# Patient Record
Sex: FEMALE | Race: OTHER | Employment: PART TIME | URBAN - METROPOLITAN AREA
[De-identification: names, ages, dates, MRNs, and addresses within clinical notes are randomized per-mention and may not be internally consistent; named-entity substitution may affect disease eponyms.]

---

## 2019-02-25 ENCOUNTER — OFFICE VISIT (OUTPATIENT)
Dept: PRIMARY CARE CLINIC | Age: 39
End: 2019-02-25

## 2019-02-25 VITALS
TEMPERATURE: 97.9 F | SYSTOLIC BLOOD PRESSURE: 123 MMHG | HEIGHT: 62 IN | WEIGHT: 148.4 LBS | OXYGEN SATURATION: 98 % | BODY MASS INDEX: 27.31 KG/M2 | HEART RATE: 70 BPM | DIASTOLIC BLOOD PRESSURE: 76 MMHG | RESPIRATION RATE: 16 BRPM

## 2019-02-25 DIAGNOSIS — M77.11 LATERAL EPICONDYLITIS OF RIGHT ELBOW: Primary | ICD-10-CM

## 2019-02-25 RX ORDER — METHYLPREDNISOLONE 4 MG/1
TABLET ORAL
Qty: 1 DOSE PACK | Refills: 0 | Status: SHIPPED | OUTPATIENT
Start: 2019-02-25 | End: 2019-05-08 | Stop reason: ALTCHOICE

## 2019-02-25 RX ORDER — IBUPROFEN 600 MG/1
600 TABLET ORAL
Qty: 20 TAB | Refills: 0 | Status: SHIPPED | OUTPATIENT
Start: 2019-02-25 | End: 2020-09-21

## 2019-02-25 NOTE — PROGRESS NOTES
Written by Reji Ware, as dictated by Dr. Mark Emerson MD.    Sarai Ferraro is a 43 y.o. female. HPI  The patient presents today c/o R arm burning pain. The pain starts on the lateral side above her elbow and travels down to her wrist. She notes that she has been experiencing numbness. Her arm is tender to touch. The patient has also noticed R arm weakness and has been dropping things. She took 2 tabs OTC Excedrin for her pain last night, without significant relief. Pt previously had surgery for R carpal tunnel and ulnar nerve decompression at elbow in 2017. As she has had to compensate for her R hand with her L, she has started having numbness in her L arm as well. Patient is a . Patient Active Problem List   Diagnosis Code    Right carpal tunnel syndrome G56.01        Current Outpatient Medications on File Prior to Visit   Medication Sig Dispense Refill    ibuprofen (MOTRIN) 800 mg tablet Take 1 Tab by mouth every eight (8) hours as needed for Pain. 45 Tab 0     No current facility-administered medications on file prior to visit. Allergies   Allergen Reactions    Hydrocodone Itching       No past medical history on file.     Past Surgical History:   Procedure Laterality Date    HX TUBAL LIGATION  2005       Family History   Problem Relation Age of Onset    Hypertension Mother    Tivis Abelson Arthritis-osteo Mother        Social History     Socioeconomic History    Marital status:      Spouse name: Not on file    Number of children: Not on file    Years of education: Not on file    Highest education level: Not on file   Social Needs    Financial resource strain: Not on file    Food insecurity - worry: Not on file    Food insecurity - inability: Not on file   [place] [employer] needs - medical: Not on file   [place] [employer] needs - non-medical: Not on file   Occupational History    Not on file   Tobacco Use    Smoking status: Never Smoker  Smokeless tobacco: Never Used   Substance and Sexual Activity    Alcohol use: Yes     Comment: socially    Drug use: No    Sexual activity: Yes     Partners: Male     Birth control/protection: None   Other Topics Concern    Not on file   Social History Narrative    Not on file       Review of Systems   Musculoskeletal: Positive for joint pain (R elbow). Negative for myalgias. +R arm weakness   Neurological: Positive for tingling (L and R arm). Negative for dizziness, sensory change and headaches. Psychiatric/Behavioral: Negative for depression, memory loss and substance abuse. Visit Vitals  /76 (BP 1 Location: Left arm, BP Patient Position: Sitting)   Pulse 70   Temp 97.9 °F (36.6 °C) (Oral)   Resp 16   Ht 5' 2\" (1.575 m)   Wt 148 lb 6.4 oz (67.3 kg)   SpO2 98%   BMI 27.14 kg/m²       Physical Exam   Constitutional: She is oriented to person, place, and time. She appears well-developed and well-nourished. No distress. Eyes: Conjunctivae and EOM are normal. Right eye exhibits no discharge. Left eye exhibits no discharge. Cardiovascular: Normal rate and regular rhythm. Pulmonary/Chest: Effort normal and breath sounds normal. She has no wheezes. Musculoskeletal:   Moderate tenderness on R arm lateral epicondyle  R lateral epicondyle painful on pronation and supination   Neurological: She is alert and oriented to person, place, and time. RUE 5/5   Skin: She is not diaphoretic. Burn scar marks on arm   Psychiatric: She has a normal mood and affect. Her behavior is normal.   Nursing note and vitals reviewed. ASSESSMENT and PLAN    ICD-10-CM ICD-9-CM    1. Lateral epicondylitis of right elbow M77.11 726.32 Medrol dosepack prescribed. Ibuprofen 600 mg prescribed. She should take 1 tab TID with food x 5-6 days. If she cannot take TID, she should take BID. I recommend wearing an arm brace and applying ice. She should take at least 2 days off from work. Home care instructions given. If her sxs persist or worsen, she should follow-up with orthopedics. Medication risks/benefits/costs/interactions/alternatives discussed with patient. Advised patient to call back or return to office if symptoms worsen/change/persist. If patient cannot reach us or should anything more severe/urgent arise she should proceed directly to the nearest emergency department. Discussed expected course/resolution/complications of diagnosis in detail with patient. Patient given a written after visit summary which includes her diagnoses, current medications and vitals. Patient expressed understanding with the diagnosis and plan.   Follow up if symptoms worsen. This plan was reviewed with the patient and patient agrees. All questions were answered. This scribe documentation was reviewed by me and accurately reflects the examination and decisions made by me.

## 2019-02-25 NOTE — PATIENT INSTRUCTIONS
Tennis Elbow: Exercises  Your Care Instructions  Here are some examples of typical rehabilitation exercises for your condition. Start each exercise slowly. Ease off the exercise if you start to have pain. Your doctor or physical therapist will tell you when you can start these exercises and which ones will work best for you. How to do the exercises  Wrist flexor stretch    1. Extend your arm in front of you with your palm up. 2. Bend your wrist, pointing your hand toward the floor. 3. With your other hand, gently bend your wrist farther until you feel a mild to moderate stretch in your forearm. 4. Hold for at least 15 to 30 seconds. Repeat 2 to 4 times. Wrist extensor stretch    1. Repeat steps 1 to 4 of the stretch above but begin with your extended hand palm down. Ball or sock squeeze    1. Hold a tennis ball (or a rolled-up sock) in your hand. 2. Make a fist around the ball (or sock) and squeeze. 3. Hold for about 6 seconds, and then relax for up to 10 seconds. 4. Repeat 8 to 12 times. 5. Switch the ball (or sock) to your other hand and do 8 to 12 times. Wrist deviation    1. Sit so that your arm is supported but your hand hangs off the edge of a flat surface, such as a table. 2. Hold your hand out like you are shaking hands with someone. 3. Move your hand up and down. 4. Repeat this motion 8 to 12 times. 5. Switch arms. 6. Try to do this exercise twice with each hand. Wrist curls    1. Place your forearm on a table with your hand hanging over the edge of the table, palm up. 2. Place a 1- to 2-pound weight in your hand. This may be a dumbbell, a can of food, or a filled water bottle. 3. Slowly raise and lower the weight while keeping your forearm on the table and your palm facing up. 4. Repeat this motion 8 to 12 times. 5. Switch arms, and do steps 1 through 4.  6. Repeat with your hand facing down toward the floor. Switch arms. Biceps curls    1.  Sit leaning forward with your legs slightly spread and your left hand on your left thigh. 2. Place your right elbow on your right thigh, and hold the weight with your forearm horizontal.  3. Slowly curl the weight up and toward your chest.  4. Repeat this motion 8 to 12 times. 5. Switch arms, and do steps 1 through 4. Follow-up care is a key part of your treatment and safety. Be sure to make and go to all appointments, and call your doctor if you are having problems. It's also a good idea to know your test results and keep a list of the medicines you take. Where can you learn more? Go to http://deondre-violeta.info/. Enter J558 in the search box to learn more about \"Tennis Elbow: Exercises. \"  Current as of: September 20, 2018  Content Version: 11.9  © 1946-6725 [employer]. Care instructions adapted under license by [employer] (which disclaims liability or warranty for this information). If you have questions about a medical condition or this instruction, always ask your healthcare professional. [place] [address] any warranty or liability for your use of this information.

## 2019-05-08 ENCOUNTER — OFFICE VISIT (OUTPATIENT)
Dept: PRIMARY CARE CLINIC | Age: 39
End: 2019-05-08

## 2019-05-08 ENCOUNTER — HOSPITAL ENCOUNTER (OUTPATIENT)
Dept: LAB | Age: 39
Discharge: HOME | End: 2019-05-08
Payer: COMMERCIAL

## 2019-05-08 VITALS
DIASTOLIC BLOOD PRESSURE: 69 MMHG | SYSTOLIC BLOOD PRESSURE: 110 MMHG | HEIGHT: 62 IN | TEMPERATURE: 98.2 F | OXYGEN SATURATION: 98 % | WEIGHT: 139.2 LBS | BODY MASS INDEX: 25.62 KG/M2 | HEART RATE: 71 BPM | RESPIRATION RATE: 17 BRPM

## 2019-05-08 DIAGNOSIS — N85.2 ENLARGED UTERUS: ICD-10-CM

## 2019-05-08 DIAGNOSIS — Z01.419 WOMEN'S ANNUAL ROUTINE GYNECOLOGICAL EXAMINATION: Primary | ICD-10-CM

## 2019-05-08 PROCEDURE — 87624 HPV HI-RISK TYP POOLED RSLT: CPT

## 2019-05-08 PROCEDURE — 88175 CYTOPATH C/V AUTO FLUID REDO: CPT

## 2019-05-08 PROCEDURE — 87491 CHLMYD TRACH DNA AMP PROBE: CPT

## 2019-05-08 NOTE — PROGRESS NOTES
Subjective:   43 y.o. female for Well Woman Check. She has been going through some family urgencies otherwise reports that she is doing fine. She has an appointment coming with psychotherapist next week. Strongly advised patient to let me know if she need any help. Her period sometimes could be heavy but usually normal length. Patient's last menstrual period was 04/05/2019. Social History: single partner, contraception - none. Pertinent past medical hstory: no history of HTN, DVT, CAD, DM, liver disease, migraines or smoking. Patient Active Problem List   Diagnosis Code    Right carpal tunnel syndrome G56.01     Current Outpatient Medications   Medication Sig Dispense Refill    ibuprofen (MOTRIN) 600 mg tablet Take 1 Tab by mouth every eight (8) hours as needed for Pain. 20 Tab 0     Allergies   Allergen Reactions    Hydrocodone Itching     No past medical history on file. Past Surgical History:   Procedure Laterality Date    HX TUBAL LIGATION  2005     Family History   Problem Relation Age of Onset    Hypertension Mother     Arthritis-osteo Mother      Social History     Tobacco Use    Smoking status: Never Smoker    Smokeless tobacco: Never Used   Substance Use Topics    Alcohol use: Yes     Comment: socially        ROS:  Feeling well. No dyspnea or chest pain on exertion. No abdominal pain, change in bowel habits, black or bloody stools. No urinary tract symptoms. GYN ROS: no breast pain or new or enlarging lumps on self exam, no vaginal bleeding, no discharge or pelvic pain, cyclic withdrawal menses only. No neurological complaints. Objective:     Visit Vitals  /69 (BP 1 Location: Left arm, BP Patient Position: Sitting)   Pulse 71   Temp 98.2 °F (36.8 °C) (Oral)   Resp 17   Ht 5' 2\" (1.575 m)   Wt 139 lb 3.2 oz (63.1 kg)   LMP 04/05/2019   SpO2 98%   BMI 25.46 kg/m²     The patient appears well, alert, oriented x 3, in no distress. ENT normal.  Neck supple.  No adenopathy or thyromegaly. JANNETH. Lungs are clear, good air entry, no wheezes, rhonchi or rales. S1 and S2 normal, no murmurs, regular rate and rhythm. Abdomen soft without tenderness, guarding,or organomegaly. Irregular masses noted in suprapubic area . Extremities show no edema, normal peripheral pulses. Neurological is normal, no focal findings. Skin: normal.     BREAST EXAM: breasts appear normal, no suspicious masses, no skin or nipple changes or axillary nodes, chaperoned by Emily Holguin. PELVIC EXAM: VULVA: normal appearing vulva with no masses, tenderness or lesions, VAGINA: normal appearing vagina with normal color and discharge, no lesions, CERVIX: normal appearing cervix without discharge or lesions, UTERUS: enlarged uterus,, ADNEXA: normal adnexa in size, nontender and no masses, PAP: Pap smear done today, exam chaperoned by Emily Holguin. Assessment/Plan:   well woman  mammogram  pap smear  counseled on breast self exam and mammography screening  additional lab tests per orders  return annually or prn    ICD-10-CM ICD-9-CM    1. Women's annual routine gynecological examination Z01.419 V72.31 PAP IG, CT-NG-TV, APTIMA HPV AND RFX 52/35,96([id number])      LIPID PANEL      METABOLIC PANEL, COMPREHENSIVE      HEMOGLOBIN A1C WITH EAG      CBC WITH AUTOMATED DIFF      TSH 3RD GENERATION      PAP IG, CT-NG-TV, APTIMA HPV AND RFX 17/11,57([id number])   2. Enlarged uterus N85.2 621.2 US PELV NON OB W TV     Diagnoses and all orders for this visit:    1. Women's annual routine gynecological examination  -     PAP IG, CT-NG-TV, APTIMA HPV AND [address] 63/75,06([id number]); Future  -     LIPID PANEL  -     METABOLIC PANEL, COMPREHENSIVE  -     HEMOGLOBIN A1C WITH EAG  -     CBC WITH AUTOMATED DIFF  -     TSH 3RD GENERATION    2. Enlarged uterus  -   D/D is fibroid, ovarian pathology. US PELV NON OB W TV; Future             Will notify result and recommendation.      Follow-up and Dispositions    · Return if symptoms worsen or fail to improve. current treatment plan is effective, no change in therapy  reviewed diet, exercise and weight control.

## 2019-05-08 NOTE — PATIENT INSTRUCTIONS

## 2019-05-09 LAB
ALBUMIN SERPL-MCNC: 4.4 G/DL (ref 3.5–5.5)
ALBUMIN/GLOB SERPL: 1.8 {RATIO} (ref 1.2–2.2)
ALP SERPL-CCNC: 61 IU/L (ref 39–117)
ALT SERPL-CCNC: 9 IU/L (ref 0–32)
AST SERPL-CCNC: 16 IU/L (ref 0–40)
BASOPHILS # BLD AUTO: 0.1 X10E3/UL (ref 0–0.2)
BASOPHILS NFR BLD AUTO: 2 %
BILIRUB SERPL-MCNC: <0.2 MG/DL (ref 0–1.2)
BUN SERPL-MCNC: 9 MG/DL (ref 6–20)
BUN/CREAT SERPL: 15 (ref 9–23)
CALCIUM SERPL-MCNC: 9.3 MG/DL (ref 8.7–10.2)
CHLORIDE SERPL-SCNC: 102 MMOL/L (ref 96–106)
CHOLEST SERPL-MCNC: 240 MG/DL (ref 100–199)
CO2 SERPL-SCNC: 22 MMOL/L (ref 20–29)
CREAT SERPL-MCNC: 0.6 MG/DL (ref 0.57–1)
EOSINOPHIL # BLD AUTO: 0.6 X10E3/UL (ref 0–0.4)
EOSINOPHIL NFR BLD AUTO: 11 %
ERYTHROCYTE [DISTWIDTH] IN BLOOD BY AUTOMATED COUNT: 15.4 % (ref 12.3–15.4)
EST. AVERAGE GLUCOSE BLD GHB EST-MCNC: 111 MG/DL
GLOBULIN SER CALC-MCNC: 2.4 G/DL (ref 1.5–4.5)
GLUCOSE SERPL-MCNC: 100 MG/DL (ref 65–99)
HBA1C MFR BLD: 5.5 % (ref 4.8–5.6)
HCT VFR BLD AUTO: 39.5 % (ref 34–46.6)
HDLC SERPL-MCNC: 88 MG/DL
HGB BLD-MCNC: 12.9 G/DL (ref 11.1–15.9)
IMM GRANULOCYTES # BLD AUTO: 0 X10E3/UL (ref 0–0.1)
IMM GRANULOCYTES NFR BLD AUTO: 0 %
LDLC SERPL CALC-MCNC: 139 MG/DL (ref 0–99)
LYMPHOCYTES # BLD AUTO: 1.5 X10E3/UL (ref 0.7–3.1)
LYMPHOCYTES NFR BLD AUTO: 25 %
MCH RBC QN AUTO: 27.9 PG (ref 26.6–33)
MCHC RBC AUTO-ENTMCNC: 32.7 G/DL (ref 31.5–35.7)
MCV RBC AUTO: 86 FL (ref 79–97)
MONOCYTES # BLD AUTO: 0.4 X10E3/UL (ref 0.1–0.9)
MONOCYTES NFR BLD AUTO: 7 %
NEUTROPHILS # BLD AUTO: 3.3 X10E3/UL (ref 1.4–7)
NEUTROPHILS NFR BLD AUTO: 55 %
PLATELET # BLD AUTO: 337 X10E3/UL (ref 150–379)
POTASSIUM SERPL-SCNC: 4.3 MMOL/L (ref 3.5–5.2)
PROT SERPL-MCNC: 6.8 G/DL (ref 6–8.5)
RBC # BLD AUTO: 4.62 X10E6/UL (ref 3.77–5.28)
SODIUM SERPL-SCNC: 137 MMOL/L (ref 134–144)
TRIGL SERPL-MCNC: 64 MG/DL (ref 0–149)
TSH SERPL DL<=0.005 MIU/L-ACNC: 4.3 UIU/ML (ref 0.45–4.5)
VLDLC SERPL CALC-MCNC: 13 MG/DL (ref 5–40)
WBC # BLD AUTO: 5.9 X10E3/UL (ref 3.4–10.8)

## 2019-05-09 NOTE — PROGRESS NOTES
Please mail the letter:     1. CBC, kidney, liver, thyroid level is within normal range. 2. Total cholesterol level and the  bad cholesterol level is elevated than normal range. Three years ago LLD level was 94 which went up to 139 this time. .Total cholesterol is up from 196 to 240. No need to start any medication at this point. Continue watching your diet, eat healthy, less marie and fatty food, more baked or grilled or broiled food. Exercise 150 minutes/week will be helpful as well. Will recheck level in one year. 3. No diabetes.

## 2019-05-10 DIAGNOSIS — N85.2 ENLARGED UTERUS: Primary | ICD-10-CM

## 2019-05-10 LAB
C TRACH RRNA SPEC QL NAA+PROBE: NEGATIVE
N GONORRHOEA RRNA SPEC QL NAA+PROBE: NEGATIVE
SPECIMEN SOURCE: NORMAL
T VAGINALIS RRNA SPEC QL NAA+PROBE: NEGATIVE

## 2019-05-13 NOTE — PROGRESS NOTES
Please mail letter;    Mayme Honor pleased to let you know that the results of your Pap smear and HPV (human papillomavirus) tests, performed during your recent examination, were normal.  In accordance with the guidelines of the [facility]etric[facility] Gyne[facility], we recommend you return for your well-woman exam in 3-5 years.

## 2019-05-28 ENCOUNTER — HOSPITAL ENCOUNTER (OUTPATIENT)
Dept: ULTRASOUND IMAGING | Age: 39
Discharge: HOME | End: 2019-05-28
Attending: FAMILY MEDICINE
Payer: COMMERCIAL

## 2019-05-28 DIAGNOSIS — N85.2 ENLARGED UTERUS: ICD-10-CM

## 2019-05-28 PROBLEM — D21.9 FIBROID: Status: ACTIVE | Noted: 2019-05-28

## 2019-05-28 PROCEDURE — 76856 US EXAM PELVIC COMPLETE: CPT

## 2019-05-28 NOTE — PROGRESS NOTES
Discussed about the finding with her over phone. Patient is asymptomatic currently. No heavy bleeding or any abnormal period or any urinary symptoms. Mention that she did notice episode of sharp pain in left side at time but otherwise she is asymptomatic. Plan is to continue to monitor and she will follow up with Gyn if needed.

## 2019-06-04 ENCOUNTER — TELEPHONE (OUTPATIENT)
Dept: PRIMARY CARE CLINIC | Age: 39
End: 2019-06-04

## 2019-06-04 DIAGNOSIS — D21.9 FIBROID: Primary | ICD-10-CM

## 2019-06-04 NOTE — TELEPHONE ENCOUNTER
Patient says she has sharp pains before starting her period. Her periods are heavy. She says she was told the fibroids in uterus are not a big deal. She would like to look into this further, and get it taken care of.   She asks about needing a referral?

## 2019-06-05 NOTE — TELEPHONE ENCOUNTER
LVM informing pt to contact [facility], contact information provided to make an appt for evaluation.

## 2019-06-19 ENCOUNTER — OFFICE VISIT (OUTPATIENT)
Dept: OBGYN CLINIC | Age: 39
End: 2019-06-19

## 2019-06-19 VITALS
WEIGHT: 142 LBS | HEIGHT: 62 IN | BODY MASS INDEX: 26.13 KG/M2 | DIASTOLIC BLOOD PRESSURE: 64 MMHG | SYSTOLIC BLOOD PRESSURE: 112 MMHG

## 2019-06-19 DIAGNOSIS — N84.1 CERVICAL POLYP: ICD-10-CM

## 2019-06-19 DIAGNOSIS — N84.0 UTERINE POLYP: ICD-10-CM

## 2019-06-19 DIAGNOSIS — N93.9 ABNORMAL UTERINE BLEEDING (AUB): Primary | ICD-10-CM

## 2019-06-19 LAB
HCG URINE, QL. (POC): NEGATIVE
VALID INTERNAL CONTROL?: YES

## 2019-06-19 NOTE — PATIENT INSTRUCTIONS
Abnormal Uterine Bleeding: Care Instructions  Your Care Instructions    Abnormal uterine bleeding (AUB) is irregular bleeding from the uterus that is longer or heavier than usual or does not occur at your regular time. Sometimes it is caused by changes in hormone levels. It can also be caused by growths in the uterus, such as fibroids or polyps. Sometimes a cause cannot be found. You may have heavy bleeding when you are not expecting your period. Your doctor may suggest a pregnancy test, if you think you are pregnant. Follow-up care is a key part of your treatment and safety. Be sure to make and go to all appointments, and call your doctor if you are having problems. It's also a good idea to know your test results and keep a list of the medicines you take. How can you care for yourself at home? · Be safe with medicines. Take pain medicines exactly as directed. ? If the doctor gave you a prescription medicine for pain, take it as prescribed. ? If you are not taking a prescription pain medicine, ask your doctor if you can take an over-the-counter medicine. · You may be low in iron because of blood loss. Eat a balanced diet that is high in iron and vitamin C. Foods rich in iron include red meat, shellfish, eggs, beans, and leafy green vegetables. Talk to your doctor about whether you need to take iron pills or a multivitamin. When should you call for help? Call 911 anytime you think you may need emergency care. For example, call if:    · You passed out (lost consciousness).    Call your doctor now or seek immediate medical care if:    · You have new or worse belly or pelvic pain.     · You have severe vaginal bleeding.     · You feel dizzy or lightheaded, or you feel like you may faint.    Watch closely for changes in your health, and be sure to contact your doctor if:    · You think you may be pregnant.     · Your bleeding gets worse.     · You do not get better as expected.    Where can you learn more?  Go to http://deondre-violeta.info/. Enter Q065 in the search box to learn more about \"Abnormal Uterine Bleeding: Care Instructions. \"  Current as of: May 14, 2018  Content Version: 11.9  © 3947-6111 [employer]. Care instructions adapted under license by [employer] (which disclaims liability or warranty for this information). If you have questions about a medical condition or this instruction, always ask your healthcare professional. [place] [address] any warranty or liability for your use of this information.

## 2019-06-19 NOTE — PROGRESS NOTES
Abnormal Uterine Bleeding      Ms. Christopher Iglesias is a 43 y.o. No obstetric history on file. female presenting for evaluation of abnormal uterine bleeding. She states she has heavy menses. Her PCP ordered an ultrasound - see images. RECENTLY DIAGNOSED WITH FIBROIDS    Her current episode of bleeding heavy last for the first 1-2 days of her 4 day cycle. Previously regular monthly cycles? yes  Menses typically last 4 days. Intermenstrual spotting/bleeding? no    Passage of large clots? yes  Flooding? no  Associated symptoms include sporadic/ occasional pelvic pain. CP, SOB, dizziness, weakness or fatigue? no  Signs of excess androgens (ie-unwanted hair growth, acne, etc)? no    Onset: last month  Location: vaginal  Severity: 1/10  Modifying factors: improves with rest    History of anemia? no  Prior transfusions? no    Ob/Gyn Hx:  No obstetric history on file. Patient's last menstrual period was 06/09/2019 (exact date).   Contraception:  STI: Denies  SA- Yes, 1 partners        Past Medical History:   Diagnosis Date    Pap smear for cervical cancer screening 5/8/19 neg HPV NEG        Past Surgical History:   Procedure Laterality Date    HX TUBAL LIGATION  2005       Family History   Problem Relation Age of Onset    Hypertension Mother    [facility] Arthritis-osteo Mother        Social History     Socioeconomic History    Marital status:      Spouse name: Not on file    Number of children: Not on file    Years of education: Not on file    Highest education level: Not on file   Occupational History    Not on file   Social Needs    Financial resource strain: Not on file    Food insecurity:     Worry: Not on file     Inability: Not on file    Transportation needs:     Medical: Not on file     Non-medical: Not on file   Tobacco Use    Smoking status: Never Smoker    Smokeless tobacco: Never Used   Substance and Sexual Activity    Alcohol use: Yes     Comment: socially    Drug use: No    Sexual activity: Yes     Partners: Male     Birth control/protection: None   Lifestyle    Physical activity:     Days per week: Not on file     Minutes per session: Not on file    Stress: Not on file   Relationships    Social connections:     Talks on phone: Not on file     Gets together: Not on file     Attends Yarsani service: Not on file     Active member of club or organization: Not on file     Attends meetings of clubs or organizations: Not on file     Relationship status: Not on file    Intimate partner violence:     Fear of current or ex partner: Not on file     Emotionally abused: Not on file     Physically abused: Not on file     Forced sexual activity: Not on file   Other Topics Concern    Not on file   Social History Narrative    Not on file       Prior to Admission medications    Medication Sig Start Date End Date Taking? Authorizing Provider   ibuprofen (MOTRIN) 600 mg tablet Take 1 Tab by mouth every eight (8) hours as needed for Pain.  2/25/19   Terrance Bartlett MD        Allergies   Allergen Reactions    Hydrocodone Itching       Review of Systems - History obtained from the patient  Constitutional: negative for weight loss, fever, night sweats  HEENT: negative for hearing loss, earache, congestion, snoring, sorethroat  CV: negative for chest pain, palpitations, edema  Resp: negative for cough, shortness of breath, wheezing  Breast: negative for breast lumps, nipple discharge, galactorrhea  GI: negative for change in bowel habits, abdominal pain, black or bloody stools  : negative for frequency, dysuria, hematuria, +vaginal bleeding, as per HPI  MSK: negative for back pain, joint pain, muscle pain  Skin: negative for itching, rash, hives  Neuro: negative for dizziness, headache, confusion, weakness  Psych: negative for anxiety, depression, change in mood  Heme/lymph: negative for bleeding, bruising, pallor      Objective:    Visit Vitals  /64   Ht 5' 2\" (1.575 m)   Wt 142 lb (64.4 kg)   LMP 06/09/2019 (Exact Date)   BMI 25.97 kg/m²       PHYSICAL EXAMINATION    Constitutional  · Appearance: well-nourished, well developed, alert, in no acute distress    HENT  · Head and Face: appears normal    Neck  · Inspection/Palpation: normal appearance, no masses or tenderness  · Lymph Nodes: no lymphadenopathy present  · Thyroid: gland size normal, nontender, no nodules or masses present on palpation    Chest  · Respiratory Effort: non-labored breathing  · Auscultation: CTAB, normal breath sounds    Cardiovascular  · Heart:  · Auscultation: regular rate and rhythm without murmur  · Extremities: no peripheral edema    Gastrointestinal  · Abdominal Examination: abdomen non-tender to palpation, normal bowel sounds, no masses present  · Liver and spleen: no hepatomegaly present, spleen not palpable  · Hernias: no hernias identified    Genitourinary  · External Genitalia: normal appearance for age, no discharge present, no tenderness present, no inflammatory lesions present, no masses present, no atrophy present  · Vagina: normal vaginal vault without central or paravaginal defects, no discharge present, no inflammatory lesions present, no masses present, 15 scopettes of blood in vault  · Bladder: non-tender to palpation  · Urethra: appears normal  · Cervix: normal , 1cm cervical polyp noted  · Uterus: enlarged size, irregular shape and consistency, mobile  · Adnexa: no adnexal tenderness present, no adnexal masses present  · Perineum: perineum within normal limits, no evidence of trauma, no rashes or skin lesions present    Skin  · General Inspection: no rash, no lesions identified    Neurologic/Psychiatric  · Mental Status:  · Orientation: grossly oriented to person, place and time  · Mood and Affect: mood normal, affect appropriate    No results found for this or any previous visit (from the past 24 hour(s)).       Assessment/Plan:   Aleta Siu is a 43 y.o. female presenting for evaluation of AUB/nonbothersome fibroids. .    Discussed possible etiologies of AUB. Discussed work-up of AUB including exam today  TSH was recently normal.  Pap was normal  EMB today    Discussed possible options for mgmt including expectant versus medical mgmt with (iron, OCPs v oral or implantable progesterone) versus endometrial ablation versus uterine artery embolization versus hysterectomy. EMB and labs today. Will call with results. Discussed scheduled NSAIDS with menses if needed. Discussed OTC iron supplemenation 1-2 times daily. Reviewed bleeding precautions and reasons to come into ER (bleeding >1-2 pads/tampons per hour, associated dizziness, SOB, CP, fever, etc). All questions answered. RTC: prn or sooner for any problems or concerns  Instructions and handouts given to patient    Christian Holguin  6/19/2019  2:51 PM       EMB Procedure Note    Chart reviewed for the following:  I have reviewed the History, Physical and updated the Allergic reactions for Cherelle Alvarez. TIME OUT performed immediately prior to start of procedure:  I have performed the following reviews on Cherelle Alvarez prior to the start of the procedure:          Patient was identified by name and date of birth   Agreement on procedure being performed was verified  Risks and Benefits explained to the patient  Consent was signed and verified     Time: 1500  Date of procedure: 6/19/2019  Procedure performed by:  Sera Gaming MD  How tolerated by patient: Well  Post Procedural Pain Scale: 2  Comments: None    Endometrial biopsy  Indications:   Cherelle Alvarez is a 43 y.o. No obstetric history on file., Patient's last menstrual period was 06/09/2019 (exact date). , who presents for an endometrial biopsy for AUB. After the indications, risks, benefits, and alternatives to performing an endometrial biopsy were explained to the patient, her questions were answered and informed consent was obtained. Procedure:   The patient was placed on the table in the dorsal lithotomy position. A bimanual exam showed the uterus to be anterior. The uterus was minmally enlarged. A speculum was placed in the vagina. The cervix was visualized. A tenaculum was NOT placed on the anterior lip of the cervix for traction. It was not necessary to dilate the cervix. A pipelle was passed through the endocervical canal without difficulty. The uterus was sounded to 9 cm's. A moderate amount of tissue was returned. This tissue was placed in formalin and sent to pathology. It was felt that an adequate sample was obtained. All instruments were removed from the vagina after hemostasis was noted. Post Procedural Status: The patient tolerated the procedure well and she reported mild cramping. The tenaculum and speculum were removed. The patient was observed for 10 minutes after the procedure. She had mild cramping at the time of discharge. There were no complications. The patient was discharged in stable condition. Will call with results. Charles Padron MD      Procedure Note    Chart reviewed for the following:  I have reviewed the History, Physical and updated the Allergic reactions for Michaela Asencio. TIME OUT performed immediately prior to start of procedure:  I have performed the following reviews on Michaela Asencio prior to the start of the procedure:          Patient was identified by name and date of birth   Agreement on procedure being performed was verified  Risks and Benefits explained to the patient  Consent was signed and verified     Time: 1500  Date of procedure: 6/19/2019  Procedure performed by:  Charles Padron MD  How tolerated by patient: Well  Post Procedural Pain Scale: 2  Comments: None    Cervical biopsy  Indications:   Michaela Asencio is a 43 y.o. No obstetric history on file., Patient's last menstrual period was 06/09/2019 (exact date). , who presents for an cervical polypectomy.  After the indications, risks, benefits, and alternatives to performing an endometrial biopsy were explained to the patient, her questions were answered and informed consent was obtained. Procedure: The patient was placed on the table in the dorsal lithotomy position. A speculum was placed in the vagina. The cervix was visualized. Ring forceps were used to perform cervical polypectomy. All instruments were removed from the vagina after hemostasis was noted. Post Procedural Status: The patient tolerated the procedure well and she reported mild cramping. The tenaculum and speculum were removed. The patient was observed for 10 minutes after the procedure. She had mild cramping at the time of discharge. There were no complications. The patient was discharged in stable condition. Will call with results.     Russ Nogueira MD

## 2019-06-24 LAB
DX ICD CODE: NORMAL
PATH REPORT.FINAL DX SPEC: NORMAL
PATH REPORT.FINAL DX SPEC: NORMAL
PATH REPORT.GROSS SPEC: NORMAL
PATH REPORT.GROSS SPEC: NORMAL
PATH REPORT.SITE OF ORIGIN SPEC: NORMAL
PATH REPORT.SITE OF ORIGIN SPEC: NORMAL
PATHOLOGIST NAME: NORMAL
PATHOLOGIST NAME: NORMAL
PAYMENT PROCEDURE: NORMAL
PAYMENT PROCEDURE: NORMAL

## 2020-02-12 ENCOUNTER — OFFICE VISIT (OUTPATIENT)
Dept: PRIMARY CARE CLINIC | Age: 40
End: 2020-02-12

## 2020-02-12 VITALS
DIASTOLIC BLOOD PRESSURE: 73 MMHG | WEIGHT: 146.2 LBS | HEART RATE: 84 BPM | SYSTOLIC BLOOD PRESSURE: 115 MMHG | TEMPERATURE: 98.2 F | OXYGEN SATURATION: 99 % | HEIGHT: 62 IN | RESPIRATION RATE: 16 BRPM | BODY MASS INDEX: 26.91 KG/M2

## 2020-02-12 DIAGNOSIS — F41.9 ANXIETY HYPERVENTILATION: Primary | ICD-10-CM

## 2020-02-12 DIAGNOSIS — R07.89 CHEST PRESSURE: ICD-10-CM

## 2020-02-12 DIAGNOSIS — F45.8 ANXIETY HYPERVENTILATION: Primary | ICD-10-CM

## 2020-02-12 DIAGNOSIS — F41.9 ANXIETY: ICD-10-CM

## 2020-02-12 DIAGNOSIS — L25.9 SKIN IRRITATION FROM SHAVING: ICD-10-CM

## 2020-02-12 NOTE — PROGRESS NOTES
Subjective:     Chief Complaint   Patient presents with    Shortness of Breath     x 1 month, trouble taking deep breaths and chest gets tight and painful, having s/s now under breast in middle    Skin Problem     woke up this morning feeling itchy all over, started on legs this AM, now all over. First noticed the itchiness over the weekend, changed soaps and still itchy        She  is a 35y.o. year old female who presents today with a concern about chest tightness about a month. She reports that she has been having trouble taking full breath. Chest feels tight and feels painful under her breast in the middle. Pain does not radiate. Denies any cough, fever, chills. She states that she is going through a lot for the past one year . Constant stress, feels anxious all the time which is getting more harder to deal lately. C/o redness, pain in her both leg after shaving her legs about a week ago. Legs are sensitive to touch. Noticed hive like of swelling in her back after she took hot shower in the weakened. It was very itchy but the swelling is gone now. Pertinent items are noted in HPI. Objective:     Vitals:    02/12/20 1508   BP: 115/73   Pulse: 84   Resp: 16   Temp: 98.2 °F (36.8 °C)   TempSrc: Oral   SpO2: 99%   Weight: 146 lb 3.2 oz (66.3 kg)   Height: 5' 2\" (1.575 m)       Physical Examination: General appearance - alert, well appearing, and in no distress, oriented to person, place, and time, normal appearing weight, crying and sobbing, hyperventilating. Consolable. Mental status - alert, oriented to person, place, and time, normal speech, dress, motor activity, and thought processes. Sobbing, tearful, hyperventilating. Chest - clear to auscultation, no wheezes, rales or rhonchi, symmetric air entry  Heart - normal rate, regular rhythm, normal S1, S2, no murmurs, rubs, clicks or gallops  Skin - skin irritation from shaving noted in her both legs.             Couple of superficial , non infected burn marks noted in her right forearm. 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 Allergies   Allergen Reactions    Hydrocodone Itching      Social History     Socioeconomic History    Marital status:      Spouse name: Not on file    Number of children: Not on file    Years of education: Not on file    Highest education level: Not on file   Tobacco Use    Smoking status: Never Smoker    Smokeless tobacco: Never Used   Substance and Sexual Activity    Alcohol use: Not Currently     Comment: rarely    Drug use: No    Sexual activity: Yes     Partners: Male     Birth control/protection: None      Family History   Problem Relation Age of Onset    Hypertension Mother     Arthritis-osteo Mother       Past Surgical History:   Procedure Laterality Date    HX TUBAL LIGATION  2005      Past Medical History:   Diagnosis Date    Pap smear for cervical cancer screening 5/8/19 neg HPV NEG      Current Outpatient Medications   Medication Sig Dispense Refill    ibuprofen (MOTRIN) 600 mg tablet Take 1 Tab by mouth every eight (8) hours as needed for Pain. 20 Tab 0        Assessment/ Plan:   Diagnoses and all orders for this visit:    1. Anxiety hyperventilation     -  After taking her history and observation I believe she is experiencing sever anxiety which causing the chest pressure as well shallow breathing. She agrees with me. I offered EKG but she deferred. She states that she does not think its heart related. I offered anxiety meds but she declined. Coping skills discussed. She is currently following up with a therapist.   2. Chest pressure        Same as #1  3. Anxiety      Same as #1  4. Skin irritation from shaving       Avoid shaving. Use ice, tylenol/advil. Medication risks/benefits/costs/interactions/alternatives discussed with patient.   Advised patient to call back or return to office if symptoms worsen/change/persist. If patient cannot reach us or should anything more severe/urgent arise he/she should proceed directly to the nearest emergency department. Discussed expected course/resolution/complications of diagnosis in detail with patient. Patient given a written after visit summary which includes her diagnoses, current medications and vitals. Patient expressed understanding with the diagnosis and plan. Follow-up and Dispositions    · Return if symptoms worsen or fail to improve.

## 2020-02-12 NOTE — PROGRESS NOTES
Ayden Vidal is a 35 y.o. female    Chief Complaint   Patient presents with    Shortness of Breath     x 1 month, trouble taking deep breaths and chest gets tight and painful, having s/s now under breast in middle    Skin Problem     woke up this morning feeling itchy all over, started on legs this AM, now all over. First noticed the itchiness over the weekend, changed soaps and still itchy       1. Have you been to the ER, urgent care clinic since your last visit? Hospitalized since your last visit? No    2. Have you seen or consulted any other health care providers outside of the [address] [place] [address] since your last visit? Include any pap smears or colon screening. No    No flowsheet data found.      Health Maintenance Due   Topic Date Due    Influenza Age 5 to Adult  08/01/2019

## 2020-02-18 ENCOUNTER — OFFICE VISIT (OUTPATIENT)
Dept: PRIMARY CARE CLINIC | Age: 40
End: 2020-02-18

## 2020-02-18 VITALS
WEIGHT: 147.6 LBS | OXYGEN SATURATION: 99 % | DIASTOLIC BLOOD PRESSURE: 79 MMHG | SYSTOLIC BLOOD PRESSURE: 131 MMHG | HEIGHT: 62 IN | HEART RATE: 84 BPM | TEMPERATURE: 98.2 F | BODY MASS INDEX: 27.16 KG/M2 | RESPIRATION RATE: 16 BRPM

## 2020-02-18 DIAGNOSIS — L50.9 HIVES: Primary | ICD-10-CM

## 2020-02-18 RX ORDER — CETIRIZINE HCL 10 MG
10 TABLET ORAL DAILY
Qty: 30 TAB | Refills: 1 | Status: SHIPPED | OUTPATIENT
Start: 2020-02-18 | End: 2020-09-21

## 2020-02-18 RX ORDER — PREDNISONE 20 MG/1
TABLET ORAL
Qty: 21 TAB | Refills: 0 | Status: SHIPPED | OUTPATIENT
Start: 2020-02-18 | End: 2020-09-21

## 2020-02-18 NOTE — PROGRESS NOTES
Xiang Beaver is a 35 y.o. female    Chief Complaint   Patient presents with    Rash     getting worse since last visit and nothing is helping. Now spreading all over body. 1. Have you been to the ER, urgent care clinic since your last visit? Hospitalized since your last visit? No    2. Have you seen or consulted any other health care providers outside of the [address] [place] [address] since your last visit? Include any pap smears or colon screening. No    No flowsheet data found. There are no preventive care reminders to display for this patient.

## 2020-02-18 NOTE — PROGRESS NOTES
Subjective:     Chief Complaint   Patient presents with    Rash     getting worse since last visit and nothing is helping. Now spreading all over body. She  is a 35y.o. year old female who presents today with a c/o itchy hives in her upper chest, face , left forearm for the past one week. She had tried benadryl, topical cream but nothing been working. She cannot figure out if anything that has triggered her symptoms. Denies using any new food, med, lotion, perfume, detergent. No new pets. Denies any cough, throat swelling. Pertinent items are noted in HPI. Objective:     Vitals:    02/18/20 1553   BP: 131/79   Pulse: 84   Resp: 16   Temp: 98.2 °F (36.8 °C)   TempSrc: Oral   SpO2: 99%   Weight: 147 lb 9.6 oz (67 kg)   Height: 5' 2\" (1.575 m)       Physical Examination: General appearance - alert, well appearing, and in no distress, oriented to person, place, and time and overweight  Mental status - alert, oriented to person, place, and time, normal mood, behavior, speech, dress, motor activity, and thought processes  Skin - hives noted in her upper chest, neck, face and left forearm.     Allergies   Allergen Reactions    Hydrocodone Itching      Social History     Socioeconomic History    Marital status:      Spouse name: Not on file    Number of children: Not on file    Years of education: Not on file    Highest education level: Not on file   Tobacco Use    Smoking status: Never Smoker    Smokeless tobacco: Never Used   Substance and Sexual Activity    Alcohol use: Not Currently     Comment: rarely    Drug use: No    Sexual activity: Yes     Partners: Male     Birth control/protection: None      Family History   Problem Relation Age of Onset    Hypertension Mother     Arthritis-osteo Mother       Past Surgical History:   Procedure Laterality Date    HX TUBAL LIGATION  2005      Past Medical History:   Diagnosis Date    Pap smear for cervical cancer screening 5/8/19 neg HPV NEG Current Outpatient Medications   Medication Sig Dispense Refill    ibuprofen (MOTRIN) 600 mg tablet Take 1 Tab by mouth every eight (8) hours as needed for Pain. 20 Tab 0        Assessment/ Plan:   Diagnoses and all orders for this visit:    1. Hives  -    Cause unknown. -   Start  predniSONE (DELTASONE) 20 mg tablet; Take 3 tab po daily for 3 days then 2 tab daily for 3 days, then 1 tab daily for 3 days, then 1/2 tab daily for 3 days.  -   Start   cetirizine (ZYRTEC) 10 mg tablet; Take 1 Tab by mouth daily. Medication risks/benefits/costs/interactions/alternatives discussed with patient. Advised patient to call back or return to office if symptoms worsen/change/persist. If patient cannot reach us or should anything more severe/urgent arise he/she should proceed directly to the nearest emergency department. Discussed expected course/resolution/complications of diagnosis in detail with patient. Patient given a written after visit summary which includes her diagnoses, current medications and vitals. Patient expressed understanding with the diagnosis and plan. Follow-up and Dispositions    · Return if symptoms worsen or fail to improve.

## 2020-09-21 ENCOUNTER — OFFICE VISIT (OUTPATIENT)
Dept: PRIMARY CARE CLINIC | Age: 40
End: 2020-09-21
Payer: MEDICAID

## 2020-09-21 VITALS
HEART RATE: 74 BPM | TEMPERATURE: 98.1 F | BODY MASS INDEX: 27.05 KG/M2 | SYSTOLIC BLOOD PRESSURE: 127 MMHG | HEIGHT: 62 IN | WEIGHT: 147 LBS | OXYGEN SATURATION: 100 % | DIASTOLIC BLOOD PRESSURE: 80 MMHG

## 2020-09-21 DIAGNOSIS — Z00.00 WELL ADULT ON ROUTINE HEALTH CHECK: Primary | ICD-10-CM

## 2020-09-21 DIAGNOSIS — D21.9 FIBROID: ICD-10-CM

## 2020-09-21 DIAGNOSIS — Z12.39 SCREENING FOR BREAST CANCER: ICD-10-CM

## 2020-09-21 PROCEDURE — 99396 PREV VISIT EST AGE 40-64: CPT | Performed by: FAMILY MEDICINE

## 2020-09-21 NOTE — PROGRESS NOTES
Room:     Identified pt with two pt identifiers. Reviewed record in preparation for visit and have obtained necessary documentation. All patient medications has been reviewed. Chief Complaint   Patient presents with    Physical     Additional information about chief complaint:    Health Maintenance Due   Topic    Flu Vaccine (1)       1. Have you been to the ER, urgent care clinic since your last visit? Hospitalized since your last visit? No    2. Have you seen or consulted any other health care providers outside of the [address] [place] [address] since your last visit? Include any pap smears or colon screening. No    Patient is accompanied by self I have received verbal consent from Pool Katz to discuss any/all medical information while they are present in the room.     bdg

## 2020-09-21 NOTE — PROGRESS NOTES
Subjective:   35 y.o. female for Well Woman Check. Patient's last menstrual period was 09/12/2020 (exact date). Social History: single partner, contraception - none. Pertinent past medical hstory: no history of HTN, DVT, CAD, DM, liver disease, migraines or smoking. Patient Active Problem List   Diagnosis Code    Right carpal tunnel syndrome G56.01    Fibroid D21.9     Current Outpatient Medications   Medication Sig Dispense Refill    multivit,calc,mins/iron/folic (ONE-A-DAY WOMENS FORMULA PO) Take  by mouth.  Lactobac no.41/Bifidobact no.7 (PROBIOTIC-10 PO) Take  by mouth.  predniSONE (DELTASONE) 20 mg tablet Take 3 tab po daily for 3 days then 2 tab daily for 3 days, then 1 tab daily for 3 days, then 1/2 tab daily for 3 days. 21 Tab 0    cetirizine (ZYRTEC) 10 mg tablet Take 1 Tab by mouth daily. 30 Tab 1    ibuprofen (MOTRIN) 600 mg tablet Take 1 Tab by mouth every eight (8) hours as needed for Pain. 20 Tab 0     Allergies   Allergen Reactions    Hydrocodone Itching     Past Medical History:   Diagnosis Date    Pap smear for cervical cancer screening 5/8/19 neg HPV NEG     Past Surgical History:   Procedure Laterality Date    HX TUBAL LIGATION  2005        ROS:  Feeling well. No dyspnea or chest pain on exertion. No abdominal pain, change in bowel habits, black or bloody stools. No urinary tract symptoms. GYN ROS: no breast pain or new or enlarging lumps on self exam, cyclic withdrawal menses only with heavy bleeding. No neurological complaints. Objective:     Visit Vitals  /80 (BP 1 Location: Left arm, BP Patient Position: Sitting)   Pulse 74   Temp 98.1 °F (36.7 °C) (Oral)   Ht 5' 2\" (1.575 m)   Wt 147 lb (66.7 kg)   LMP 09/12/2020 (Exact Date)   SpO2 100%   BMI 26.89 kg/m²     The patient appears well, alert, oriented x 3, in no distress. ENT normal.  Neck supple. No adenopathy or thyromegaly. JANNETH. Lungs are clear, good air entry, no wheezes, rhonchi or rales.  S1 and S2 normal, no murmurs, regular rate and rhythm. Abdomen soft . Irregular mass in lower abdomen( Fibroid) Extremities show no edema, normal peripheral pulses. Neurological is normal, no focal findings. BREAST EXAM: not examined    PELVIC EXAM: examination not indicated    Assessment/Plan:   well woman  mammogram  pap smear  counseled on breast self exam and mammography screening  return annually or prn    ICD-10-CM ICD-9-CM    1. Well adult on routine health check  Z00.00 V70.0 ISAAC 3D BEN W MAMMO BI SCREENING INCL CAD      CBC WITH AUTOMATED DIFF      THYROID CASCADE PROFILE      LIPID PANEL      METABOLIC PANEL, COMPREHENSIVE      HEMOGLOBIN A1C WITH EAG   2. Screening for breast cancer  Z12.39 V76.10 ISAAC 3D BEN W MAMMO BI SCREENING INCL CAD   3. Fibroid  D21.9 215.9      Diagnoses and all orders for this visit:    1. Well adult on routine health check  -     ISAAC 3D BEN W MAMMO BI SCREENING INCL CAD; Future  -     CBC WITH AUTOMATED DIFF; Future  -     THYROID CASCADE PROFILE; Future  -     LIPID PANEL; Future  -     METABOLIC PANEL, COMPREHENSIVE; Future  -     HEMOGLOBIN A1C WITH EAG; Future    2. Screening for breast cancer  -     ISAAC 3D BEN W MAMMO BI SCREENING INCL CAD; Future    3. Fibroid      Seems like the size of the fibroid has gotten bigger from last time. Patient also feeling discomfort in lower abdomen. Advised her to follow up with Dr. Rodrick Veronica and Dispositions    · Return if symptoms worsen or fail to improve. current treatment plan is effective, no change in therapy  reviewed diet, exercise and weight control.

## 2020-10-07 DIAGNOSIS — Z00.00 WELL ADULT ON ROUTINE HEALTH CHECK: ICD-10-CM

## 2020-10-08 NOTE — PROGRESS NOTES
Please send letter:    1. CBC, kidney, liver level is within normal range. Thyroid level is pending. 2. Total cholesterol level and  the bad cholesterol level is mildly elevated. Improved than last time. No need to start any medication at this point. Continue watching your diet, eat healthy, less marie and fatty food, more baked or grilled or broiled food. Exercise 150 minutes/week will be helpful as well. Will recheck level in one year.

## 2020-10-13 LAB
ALBUMIN SERPL-MCNC: 3.8 G/DL (ref 3.5–5)
ALBUMIN/GLOB SERPL: 1.2 {RATIO} (ref 1.1–2.2)
ALP SERPL-CCNC: 66 U/L (ref 45–117)
ALT SERPL-CCNC: 20 U/L (ref 12–78)
ANION GAP SERPL CALC-SCNC: 3 MMOL/L (ref 5–15)
AST SERPL-CCNC: 15 U/L (ref 15–37)
BASOPHILS # BLD: 0.1 K/UL (ref 0–0.1)
BASOPHILS NFR BLD: 1 % (ref 0–1)
BILIRUB SERPL-MCNC: 0.3 MG/DL (ref 0.2–1)
BUN SERPL-MCNC: 5 MG/DL (ref 6–20)
BUN/CREAT SERPL: 7 (ref 12–20)
CALCIUM SERPL-MCNC: 8.9 MG/DL (ref 8.5–10.1)
CHLORIDE SERPL-SCNC: 106 MMOL/L (ref 97–108)
CHOLEST SERPL-MCNC: 241 MG/DL
CO2 SERPL-SCNC: 29 MMOL/L (ref 21–32)
CREAT SERPL-MCNC: 0.67 MG/DL (ref 0.55–1.02)
DIFFERENTIAL METHOD BLD: ABNORMAL
EOSINOPHIL # BLD: 0.7 K/UL (ref 0–0.4)
EOSINOPHIL NFR BLD: 9 % (ref 0–7)
ERYTHROCYTE [DISTWIDTH] IN BLOOD BY AUTOMATED COUNT: 17.6 % (ref 11.5–14.5)
EST. AVERAGE GLUCOSE BLD GHB EST-MCNC: 108 MG/DL
GLOBULIN SER CALC-MCNC: 3.2 G/DL (ref 2–4)
GLUCOSE SERPL-MCNC: 94 MG/DL (ref 65–100)
HBA1C MFR BLD: 5.4 % (ref 4–5.6)
HCT VFR BLD AUTO: 39.8 % (ref 35–47)
HDLC SERPL-MCNC: 97 MG/DL
HDLC SERPL: 2.5 {RATIO} (ref 0–5)
HGB BLD-MCNC: 12.3 G/DL (ref 11.5–16)
IMM GRANULOCYTES # BLD AUTO: 0 K/UL (ref 0–0.04)
IMM GRANULOCYTES NFR BLD AUTO: 0 % (ref 0–0.5)
LDLC SERPL CALC-MCNC: 128.8 MG/DL (ref 0–100)
LIPID PROFILE,FLP: ABNORMAL
LYMPHOCYTES # BLD: 1.6 K/UL (ref 0.8–3.5)
LYMPHOCYTES NFR BLD: 20 % (ref 12–49)
MCH RBC QN AUTO: 26.7 PG (ref 26–34)
MCHC RBC AUTO-ENTMCNC: 30.9 G/DL (ref 30–36.5)
MCV RBC AUTO: 86.5 FL (ref 80–99)
MONOCYTES # BLD: 0.6 K/UL (ref 0–1)
MONOCYTES NFR BLD: 7 % (ref 5–13)
NEUTS SEG # BLD: 4.8 K/UL (ref 1.8–8)
NEUTS SEG NFR BLD: 63 % (ref 32–75)
NRBC # BLD: 0 K/UL (ref 0–0.01)
NRBC BLD-RTO: 0 PER 100 WBC
PLATELET # BLD AUTO: 350 K/UL (ref 150–400)
PMV BLD AUTO: 10 FL (ref 8.9–12.9)
POTASSIUM SERPL-SCNC: 4.5 MMOL/L (ref 3.5–5.1)
PROT SERPL-MCNC: 7 G/DL (ref 6.4–8.2)
RBC # BLD AUTO: 4.6 M/UL (ref 3.8–5.2)
SODIUM SERPL-SCNC: 138 MMOL/L (ref 136–145)
TRIGL SERPL-MCNC: 76 MG/DL (ref ?–150)
TSH SERPL-ACNC: 4.47 UIU/ML (ref 0.45–4.5)
VLDLC SERPL CALC-MCNC: 15.2 MG/DL
WBC # BLD AUTO: 7.8 K/UL (ref 3.6–11)

## 2020-10-22 ENCOUNTER — OFFICE VISIT (OUTPATIENT)
Dept: OBGYN CLINIC | Age: 40
End: 2020-10-22
Payer: COMMERCIAL

## 2020-10-22 VITALS
HEIGHT: 62 IN | DIASTOLIC BLOOD PRESSURE: 70 MMHG | WEIGHT: 144 LBS | BODY MASS INDEX: 26.5 KG/M2 | SYSTOLIC BLOOD PRESSURE: 114 MMHG

## 2020-10-22 DIAGNOSIS — R10.2 PELVIC PAIN IN FEMALE: Primary | ICD-10-CM

## 2020-10-22 PROCEDURE — 99213 OFFICE O/P EST LOW 20 MIN: CPT | Performed by: OBSTETRICS & GYNECOLOGY

## 2020-10-22 NOTE — PATIENT INSTRUCTIONS
Pelvic Exam: Care Instructions  Your Care Instructions     When your doctor examines all of your pelvic organs, it's called a pelvic exam. Two good reasons to have this kind of exam are to check for sexually transmitted infections (STIs) and to get a Pap test. A Pap test is also called a Pap smear. It checks for early changes that can lead to cancer of the cervix. Sometimes a pelvic exam is part of a regular checkup. Your doctor may ask you to avoid vaginal sex, tampons, vaginal medicines, vaginal sprays or powders, and douching for 1 to 2 days before the test.  Other times, women have this kind of exam at any time of the month. This is because they have pelvic pain, bleeding, or discharge. Or they may have another pelvic problem. Before your exam, it's important to share some information with your doctor. For example, if you are a survivor of rape or sexual abuse, you can talk about any concerns you may have. Your doctor will also want to know if you are pregnant or use birth control. And he or she will want to hear about any problems, surgeries, or procedures you have had in your pelvic area. You will also need to tell your doctor when your last period was. Follow-up care is a key part of your treatment and safety. Be sure to make and go to all appointments, and call your doctor if you are having problems. It's also a good idea to know your test results and keep a list of the medicines you take. How is a pelvic exam done? · During a pelvic exam, you will:  ? Take off your clothes below the waist. You will get a paper or cloth cover to put over the lower half of your body. If this is regular checkup, you may undress completely and put on a gown. ? Lie on your back on an exam table. Your feet will be raised above you. Stirrups will support your feet. · The doctor will:  ? Ask you to relax your knees. Your knees need to lean out, toward the walls. ?  Check the opening of your vagina for sores or swelling. ? Gently put a tool called a speculum into your vagina. It opens the vagina a little bit. You will feel some pressure. But if you are relaxed, it will not hurt. It lets your doctor see inside the vagina. ? Use a small brush, spatula, or swab to get a sample of cells, if you are having a Pap test or culture. The doctor then removes the speculum. ? Put on gloves and put one or two fingers of one hand into your vagina. The other hand goes on your lower belly. This lets your doctor feel your pelvic organs. You will probably feel some pressure. Try to stay relaxed. ? Put one gloved finger into your rectum and one into your vagina, if needed. This can also help check your pelvic organs. This exam takes about 10 minutes. At the end, you will get a washcloth or tissue to clean your vaginal area. You can then get dressed. Why is a pelvic exam done? A pelvic exam may be done:  · As part of a woman's regular physical checkup. The exam may include a Pap test.  · To check for vaginal infection. · To check for sexually transmitted infections, such as chlamydia or herpes. · To help find the cause of abnormal uterine bleeding. · To look for problems like uterine fibroids, ovarian cysts, or uterine prolapse. · To find the cause of pelvic or belly pain. · Before inserting an intrauterine device (IUD) for birth control. · To collect evidence if you've been sexually assaulted. What are the risks of a pelvic exam?  There is a small chance that the doctor will find something on a pelvic exam that would not have caused a problem. This is called overdiagnosis. It could lead to tests or treatment you don't need. When should you call for help? Watch closely for changes in your health, and be sure to contact your doctor if you have any problems. Where can you learn more? Go to http://www.gray.com/  Enter M421 in the search box to learn more about \"Pelvic Exam: Care Instructions. \"  Current as of: November 8, 2019               Content Version: 12.6  © 6570-8335 [employer]. Care instructions adapted under license by [employer] (which disclaims liability or warranty for this information). If you have questions about a medical condition or this instruction, always ask your healthcare professional. [address] any warranty or liability for your use of this information.

## 2020-10-22 NOTE — PROGRESS NOTES
Pelvic Pain    CC: Pelvic Pain    HPI: Ms. Karrie Fernandez is a 35 y.o. No obstetric history on file. female presenting for evaluation of pelvic pain. No LMP recorded. . Her past medical history is notable for multiple uterine fibroid. She has no additional complaints and has not yet been evaluated for pelvic pain. Onset: weeks  Location of pain: midline pelvis  Quality of pain: pressure  Severity? bothersome  Modifying factors: worse with movement    Other associated symptoms:   Denies vaginal discharge. Denies abdominal distension, constipation, diarrhea, dysuria, hematuria, incontinence, urinary urgency/frequency. Denies fevers/chills. Denies dyspareunia.     Past Medical History:   Diagnosis Date    Pap smear for cervical cancer screening 5/8/19 neg HPV NEG       Past Surgical History:   Procedure Laterality Date    HX TUBAL LIGATION  2005       Family History   Problem Relation Age of Onset    Hypertension Mother    23 [facility] Arthritis-osteo Mother        Social History     Socioeconomic History    Marital status:      Spouse name: Not on file    Number of children: Not on file    Years of education: Not on file    Highest education level: Not on file   Occupational History    Not on file   Social Needs    Financial resource strain: Not on file    Food insecurity     Worry: Not on file     Inability: Not on file   [employer] needs     Medical: Not on file     Non-medical: Not on file   Tobacco Use    Smoking status: Never Smoker    Smokeless tobacco: Never Used   Substance and Sexual Activity    Alcohol use: Not Currently     Comment: rarely    Drug use: No    Sexual activity: Yes     Partners: Male     Birth control/protection: None   Lifestyle    Physical activity     Days per week: Not on file     Minutes per session: Not on file    Stress: Not on file   Relationships    Social connections     Talks on phone: Not on file     Gets together: Not on file     Attends Voodoo service: Not on file     Active member of club or organization: Not on file     Attends meetings of clubs or organizations: Not on file     Relationship status: Not on file    Intimate partner violence     Fear of current or ex partner: Not on file     Emotionally abused: Not on file     Physically abused: Not on file     Forced sexual activity: Not on file   Other Topics Concern    Not on file   Social History Narrative    Not on file       Current Outpatient Medications   Medication Sig Dispense Refill    multivit,calc,mins/iron/folic (ONE-A-DAY WOMENS FORMULA PO) Take  by mouth.  Lactobac no.41/Bifidobact no.7 (PROBIOTIC-10 PO) Take  by mouth. Allergies   Allergen Reactions    Hydrocodone Itching       Review of Systems - History obtained from the patient  Constitutional: negative for weight loss, fever, night sweats  HEENT: negative for hearing loss, earache, congestion, snoring, sorethroat  CV: negative for chest pain, palpitations, edema  Resp: negative for cough, shortness of breath, wheezing  GI: negative for change in bowel habits, abdominal pain, black or bloody stools  : negative for frequency, dysuria, hematuria, vaginal discharge  MSK: negative for back pain, joint pain, muscle pain  Breast: negative for breast lumps, nipple discharge, galactorrhea  Skin :negative for itching, rash, hives  Neuro: negative for dizziness, headache, confusion, weakness  Psych: negative for anxiety, depression, change in mood  Heme/lymph: negative for bleeding, bruising, pallor    Physical Exam    There were no vitals taken for this visit.     HENT  · Head and Face: appears normal    Skin  · General Inspection: no rash, no lesions identified    Neurologic/Psychiatric  · Mental Status:  · Orientation: grossly oriented to person, place and time  · Mood and Affect: mood normal, affect appropriate    Results for orders placed or performed in visit on 10/07/20   HEMOGLOBIN A1C WITH EAG   Result Value Ref Range Hemoglobin A1c 5.4 4.0 - 5.6 %    Est. average glucose 688 mg/dL   METABOLIC PANEL, COMPREHENSIVE   Result Value Ref Range    Sodium 138 136 - 145 mmol/L    Potassium 4.5 3.5 - 5.1 mmol/L    Chloride 106 97 - 108 mmol/L    CO2 29 21 - 32 mmol/L    Anion gap 3 (L) 5 - 15 mmol/L    Glucose 94 65 - 100 mg/dL    BUN 5 (L) 6 - 20 MG/DL    Creatinine 0.67 0.55 - 1.02 MG/DL    BUN/Creatinine ratio 7 (L) 12 - 20      GFR est AA >60 >60 ml/min/1.73m2    GFR est non-AA >60 >60 ml/min/1.73m2    Calcium 8.9 8.5 - 10.1 MG/DL    Bilirubin, total 0.3 0.2 - 1.0 MG/DL    ALT (SGPT) 20 12 - 78 U/L    AST (SGOT) 15 15 - 37 U/L    Alk. phosphatase 66 45 - 117 U/L    Protein, total 7.0 6.4 - 8.2 g/dL    Albumin 3.8 3.5 - 5.0 g/dL    Globulin 3.2 2.0 - 4.0 g/dL    A-G Ratio 1.2 1.1 - 2.2     LIPID PANEL   Result Value Ref Range    LIPID PROFILE          Cholesterol, total 241 (H) <200 MG/DL    Triglyceride 76 <150 MG/DL    HDL Cholesterol 97 MG/DL    LDL, calculated 128.8 (H) 0 - 100 MG/DL    VLDL, calculated 15.2 MG/DL    CHOL/HDL Ratio 2.5 0.0 - 5.0     THYROID CASCADE PROFILE   Result Value Ref Range    TSH 4.470 0.450 - 4.500 uIU/mL   CBC WITH AUTOMATED DIFF   Result Value Ref Range    WBC 7.8 3.6 - 11.0 K/uL    RBC 4.60 3.80 - 5.20 M/uL    HGB 12.3 11.5 - 16.0 g/dL    HCT 39.8 35.0 - 47.0 %    MCV 86.5 80.0 - 99.0 FL    MCH 26.7 26.0 - 34.0 PG    MCHC 30.9 30.0 - 36.5 g/dL    RDW 17.6 (H) 11.5 - 14.5 %    PLATELET 672 764 - 139 K/uL    MPV 10.0 8.9 - 12.9 FL    NRBC 0.0 0  WBC    ABSOLUTE NRBC 0.00 0.00 - 0.01 K/uL    NEUTROPHILS 63 32 - 75 %    LYMPHOCYTES 20 12 - 49 %    MONOCYTES 7 5 - 13 %    EOSINOPHILS 9 (H) 0 - 7 %    BASOPHILS 1 0 - 1 %    IMMATURE GRANULOCYTES 0 0.0 - 0.5 %    ABS. NEUTROPHILS 4.8 1.8 - 8.0 K/UL    ABS. LYMPHOCYTES 1.6 0.8 - 3.5 K/UL    ABS. MONOCYTES 0.6 0.0 - 1.0 K/UL    ABS. EOSINOPHILS 0.7 (H) 0.0 - 0.4 K/UL    ABS. BASOPHILS 0.1 0.0 - 0.1 K/UL    ABS. IMM.  GRANS. 0.0 0.00 - 0.04 K/UL    DF AUTOMATED         Us Pelv Non Obs    Result Date: 5/28/2019  Enlarged fibroid uterus. Normal ovaries. Assessment/Plan:  35 y.o. with chronic pelvic pain and pressure in setting of known fibroids. Plan to repeat TVUS to see if fibroids are enlarging as she is certainly feeling them more now.      María Carmichael  10/22/2020  12:27 PM     Signed By: Jennifefr Mullins MD     October 22, 2020

## 2020-10-27 ENCOUNTER — TELEPHONE (OUTPATIENT)
Dept: OBGYN CLINIC | Age: 40
End: 2020-10-27

## 2020-10-27 NOTE — TELEPHONE ENCOUNTER
Patient called in requesting an appointment for an ultrasound and visit to check on her fibroids.  Patient scheduled for next Monday 11/2 at 1:30pm.

## 2020-10-30 ENCOUNTER — HOSPITAL ENCOUNTER (OUTPATIENT)
Dept: MAMMOGRAPHY | Age: 40
Discharge: HOME | End: 2020-10-30
Attending: FAMILY MEDICINE
Payer: COMMERCIAL

## 2020-10-30 DIAGNOSIS — Z00.00 WELL ADULT ON ROUTINE HEALTH CHECK: ICD-10-CM

## 2020-10-30 DIAGNOSIS — Z12.39 SCREENING FOR BREAST CANCER: ICD-10-CM

## 2020-10-30 PROCEDURE — 77063 BREAST TOMOSYNTHESIS BI: CPT

## 2020-11-02 ENCOUNTER — OFFICE VISIT (OUTPATIENT)
Dept: OBGYN CLINIC | Age: 40
End: 2020-11-02
Payer: COMMERCIAL

## 2020-11-02 VITALS — BODY MASS INDEX: 26.34 KG/M2 | WEIGHT: 144 LBS

## 2020-11-02 DIAGNOSIS — D21.9 FIBROID: Primary | ICD-10-CM

## 2020-11-02 PROCEDURE — 99213 OFFICE O/P EST LOW 20 MIN: CPT | Performed by: OBSTETRICS & GYNECOLOGY

## 2020-11-02 PROCEDURE — 76830 TRANSVAGINAL US NON-OB: CPT | Performed by: OBSTETRICS & GYNECOLOGY

## 2020-11-02 NOTE — PROGRESS NOTES
Patient presents today to discuss ultrasound results. We discussed relatively unchanged size of multifibriod uterus compared to last ultrasound. We discussed options for mgmt today including expectant mgmt v hysterectomy for pressure symptoms. She declines meeting with IR to discuss [place]. She elects for expectant mgmt now. Visit time 15min. More than 50% of my face-to-face time was spend on patient counseling. Signed By: Danielle Flores     November 2, 2020      Signed By: Giovany Abdullahi MD     November 2, 2020      Visit time 15min. More than 50% of my face-to-face time was spend on patient counseling.

## 2020-11-03 ENCOUNTER — TELEPHONE (OUTPATIENT)
Dept: PRIMARY CARE CLINIC | Age: 40
End: 2020-11-03

## 2020-11-03 DIAGNOSIS — G56.01 RIGHT CARPAL TUNNEL SYNDROME: Primary | ICD-10-CM

## 2020-11-03 NOTE — TELEPHONE ENCOUNTER
Patient is having on going right hand pain and would like a referral to an ortho to be seen for this, it is causing her to have issues sleeping now.

## 2020-11-10 ENCOUNTER — TRANSCRIBE ORDER (OUTPATIENT)
Dept: SCHEDULING | Age: 40
End: 2020-11-10

## 2020-11-10 DIAGNOSIS — M25.831 MASS OF JOINT OF RIGHT WRIST: Primary | ICD-10-CM

## 2020-11-17 ENCOUNTER — HOSPITAL ENCOUNTER (OUTPATIENT)
Dept: MRI IMAGING | Age: 40
Discharge: HOME | End: 2020-11-17
Attending: ORTHOPAEDIC SURGERY
Payer: COMMERCIAL

## 2020-11-17 DIAGNOSIS — M25.831 MASS OF JOINT OF RIGHT WRIST: ICD-10-CM

## 2020-11-17 PROCEDURE — 73221 MRI JOINT UPR EXTREM W/O DYE: CPT

## 2020-12-29 ENCOUNTER — TELEPHONE (OUTPATIENT)
Dept: PRIMARY CARE CLINIC | Age: 40
End: 2020-12-29

## 2020-12-29 NOTE — TELEPHONE ENCOUNTER
I called patient and let her know the last pain medication we have in the system is tramadol and IBU. She verbalized her understanding and thanked me.

## 2020-12-29 NOTE — TELEPHONE ENCOUNTER
----- Message from Yazan Saavedra sent at 12/29/2020  1:16 PM EST -----  Regarding: MD Dhruv/Telephone  General Message/Vendor Calls    Caller's first and last name: Self. Reason for call: Discuss pain medications. Callback required yes/no and why: Yes      Best contact number(s): [phone]      Details to clarify the request: Pt having surgery to remove cyst on wrist tomorrow, would like to know which pain med she has been on in the past, since she is allergic to Hydrocodone.       Yazan Saavedra

## 2022-03-08 ENCOUNTER — OFFICE VISIT (OUTPATIENT)
Dept: PRIMARY CARE CLINIC | Age: 42
End: 2022-03-08
Payer: COMMERCIAL

## 2022-03-08 VITALS
DIASTOLIC BLOOD PRESSURE: 74 MMHG | TEMPERATURE: 98.4 F | HEIGHT: 62 IN | OXYGEN SATURATION: 100 % | WEIGHT: 139.6 LBS | HEART RATE: 70 BPM | BODY MASS INDEX: 25.69 KG/M2 | RESPIRATION RATE: 16 BRPM | SYSTOLIC BLOOD PRESSURE: 120 MMHG

## 2022-03-08 DIAGNOSIS — Z11.59 ENCOUNTER FOR HEPATITIS C SCREENING TEST FOR LOW RISK PATIENT: ICD-10-CM

## 2022-03-08 DIAGNOSIS — Z23 NEED FOR DIPHTHERIA-TETANUS-PERTUSSIS (TDAP) VACCINE: ICD-10-CM

## 2022-03-08 DIAGNOSIS — Z12.31 ENCOUNTER FOR SCREENING MAMMOGRAM FOR MALIGNANT NEOPLASM OF BREAST: ICD-10-CM

## 2022-03-08 DIAGNOSIS — D21.9 FIBROID: ICD-10-CM

## 2022-03-08 DIAGNOSIS — Z00.00 WELL ADULT ON ROUTINE HEALTH CHECK: Primary | ICD-10-CM

## 2022-03-08 PROCEDURE — 99396 PREV VISIT EST AGE 40-64: CPT | Performed by: FAMILY MEDICINE

## 2022-03-08 NOTE — PROGRESS NOTES
Subjective:   42 y.o. female for Well Woman Check. Patient's last menstrual period was 02/26/2022. Hx of fibroid, getting bigger. Heavy period and having abdominal pain when she lays flat. Social History: single partner, contraception - none. Pertinent past medical hstory: no history of HTN, DVT, CAD, DM, liver disease, migraines or smoking. Patient Active Problem List   Diagnosis Code    Right carpal tunnel syndrome G56.01    Fibroid D21.9     Current Outpatient Medications   Medication Sig Dispense Refill    multivit,calc,mins/iron/folic (ONE-A-DAY WOMENS FORMULA PO) Take  by mouth.  Lactobac no.41/Bifidobact no.7 (PROBIOTIC-10 PO) Take  by mouth. (Patient not taking: Reported on 3/8/2022)       Allergies   Allergen Reactions    Hydrocodone Itching     Past Medical History:   Diagnosis Date    Pap smear for cervical cancer screening 5/8/19 neg HPV NEG     Family History   Problem Relation Age of Onset    Hypertension Mother     OSTEOARTHRITIS Mother      Social History     Tobacco Use    Smoking status: Never Smoker    Smokeless tobacco: Never Used   Substance Use Topics    Alcohol use: Not Currently     Comment: rarely        ROS:  Feeling well. No dyspnea or chest pain on exertion. No abdominal pain, change in bowel habits, black or bloody stools. No urinary tract symptoms. GYN ROS: no breast pain or new or enlarging lumps on self exam. No neurological complaints. Objective:     Visit Vitals  /74 (BP 1 Location: Right arm, BP Patient Position: Sitting, BP Cuff Size: Adult)   Pulse 70   Temp 98.4 °F (36.9 °C) (Temporal)   Resp 16   Ht 5' 2\" (1.575 m)   Wt 139 lb 9.6 oz (63.3 kg)   LMP 02/26/2022   SpO2 100%   BMI 25.53 kg/m²     The patient appears well, alert, oriented x 3, in no distress. ENT normal.  Neck supple. No adenopathy or thyromegaly. JANNETH. Lungs are clear, good air entry, no wheezes, rhonchi or rales. S1 and S2 normal, no murmurs, regular rate and rhythm. Abdomen : irregular mass in lower abdomen extended near the umbilicus. Extremities show no edema, normal peripheral pulses. Neurological is normal, no focal findings. BREAST EXAM: breasts appear normal, no suspicious masses, no skin or nipple changes or axillary nodes    PELVIC EXAM: examination not indicated    Assessment/Plan:   well woman  mammogram  pap smear  counseled on breast self exam and mammography screening  additional lab tests per orders  return annually or prn    ICD-10-CM ICD-9-CM    1. Well adult on routine health check  O11.66 I03.4 METABOLIC PANEL, COMPREHENSIVE      LIPID PANEL      HEMOGLOBIN A1C WITH EAG      CBC WITH AUTOMATED DIFF      TSH 3RD GENERATION   2. Encounter for hepatitis C screening test for low risk patient  Z11.59 V73.89 HEPATITIS C AB   3. Need for diphtheria-tetanus-pertussis (Tdap) vaccine  Z23 V06.1 TETANUS, DIPHTHERIA TOXOIDS AND ACELLULAR PERTUSSIS VACCINE (TDAP), IN INDIVIDS. >=7, IM   4. Encounter for screening mammogram for malignant neoplasm of breast  Z12.31 V76.12 [facility] 3D BEN W MAMMO BI SCREENING INCL CAD   5. Fibroid  D21.9 215.9 REFERRAL TO OBSTETRICS AND GYNECOLOGY     Diagnoses and all orders for this visit:    1. Well adult on routine health check  -     METABOLIC PANEL, COMPREHENSIVE; Future  -     LIPID PANEL; Future  -     HEMOGLOBIN A1C WITH EAG; Future  -     CBC WITH AUTOMATED DIFF; Future  -     TSH 3RD GENERATION; Future    2. Fibroid  -   Strongly advised to make appointment with gyn. REFERRAL TO OBSTETRICS AND GYNECOLOGY    3. Encounter for hepatitis C screening test for low risk patient  -     HEPATITIS C AB; Future    4. Need for diphtheria-tetanus-pertussis (Tdap) vaccine  -     TETANUS, DIPHTHERIA TOXOIDS AND ACELLULAR PERTUSSIS VACCINE (TDAP), IN INDIVIDS. >=7, IM    5. Encounter for screening mammogram for malignant neoplasm of breast  -     [facility] 3D BEN W MAMMO BI SCREENING INCL CAD;  Future      Follow-up and Dispositions    · Return if symptoms worsen or fail to improve. current treatment plan is effective, no change in therapy  reviewed diet, exercise and weight control.

## 2022-03-08 NOTE — PROGRESS NOTES
Identified pt with two pt identifiers(name and ). Chief Complaint   Patient presents with    Physical        3 most recent PHQ Screens 3/8/2022   Little interest or pleasure in doing things Not at all   Feeling down, depressed, irritable, or hopeless Not at all   Total Score PHQ 2 0   Trouble falling or staying asleep, or sleeping too much Not at all   Feeling tired or having little energy Not at all   Poor appetite, weight loss, or overeating Not at all   Feeling bad about yourself - or that you are a failure or have let yourself or your family down Not at all   Trouble concentrating on things such as school, work, reading, or watching TV Not at all   Moving or speaking so slowly that other people could have noticed; or the opposite being so fidgety that others notice Not at all   Thoughts of being better off dead, or hurting yourself in some way Not at all   PHQ 9 Score 0        Vitals:    22 1410   BP: 120/74   Pulse: 70   Resp: 16   Temp: 98.4 °F (36.9 °C)   TempSrc: Temporal   SpO2: 100%   Weight: 139 lb 9.6 oz (63.3 kg)   Height: 5' 2\" (1.575 m)   LMP: 2022       Health Maintenance Due   Topic    Hepatitis C Screening     DTaP/Tdap/Td series (2 - Td or Tdap)    Flu Vaccine (1)       1. Have you been to the ER, urgent care clinic since your last visit? Hospitalized since your last visit? No    2. Have you seen or consulted any other health care providers outside of the [address] [place] [address] since your last visit? Include any pap smears or colon screening.  No

## 2022-03-09 PROCEDURE — 90715 TDAP VACCINE 7 YRS/> IM: CPT | Performed by: FAMILY MEDICINE

## 2022-03-19 PROBLEM — D21.9 FIBROID: Status: ACTIVE | Noted: 2019-05-28

## 2022-10-31 ENCOUNTER — HOSPITAL ENCOUNTER (OUTPATIENT)
Dept: PREADMISSION TESTING | Age: 42
Discharge: HOME | End: 2022-10-31
Payer: COMMERCIAL

## 2022-10-31 VITALS
BODY MASS INDEX: 26.52 KG/M2 | HEIGHT: 62 IN | OXYGEN SATURATION: 98 % | HEART RATE: 81 BPM | WEIGHT: 144.1 LBS | DIASTOLIC BLOOD PRESSURE: 86 MMHG | SYSTOLIC BLOOD PRESSURE: 131 MMHG | TEMPERATURE: 97.5 F | RESPIRATION RATE: 16 BRPM

## 2022-10-31 LAB
ABO + RH BLD: NORMAL
BASOPHILS # BLD: 0.1 K/UL (ref 0–0.1)
BASOPHILS NFR BLD: 1 % (ref 0–1)
BLOOD GROUP ANTIBODIES SERPL: NORMAL
DIFFERENTIAL METHOD BLD: ABNORMAL
EOSINOPHIL # BLD: 0.8 K/UL (ref 0–0.4)
EOSINOPHIL NFR BLD: 9 % (ref 0–7)
ERYTHROCYTE [DISTWIDTH] IN BLOOD BY AUTOMATED COUNT: 15 % (ref 11.5–14.5)
HCG UR QL: NEGATIVE
HCT VFR BLD AUTO: 41.3 % (ref 35–47)
HGB BLD-MCNC: 13.2 G/DL (ref 11.5–16)
IMM GRANULOCYTES # BLD AUTO: 0 K/UL (ref 0–0.04)
IMM GRANULOCYTES NFR BLD AUTO: 0 % (ref 0–0.5)
LYMPHOCYTES # BLD: 2.3 K/UL (ref 0.8–3.5)
LYMPHOCYTES NFR BLD: 24 % (ref 12–49)
MCH RBC QN AUTO: 27.8 PG (ref 26–34)
MCHC RBC AUTO-ENTMCNC: 32 G/DL (ref 30–36.5)
MCV RBC AUTO: 86.9 FL (ref 80–99)
MONOCYTES # BLD: 0.6 K/UL (ref 0–1)
MONOCYTES NFR BLD: 6 % (ref 5–13)
NEUTS SEG # BLD: 5.7 K/UL (ref 1.8–8)
NEUTS SEG NFR BLD: 60 % (ref 32–75)
NRBC # BLD: 0 K/UL (ref 0–0.01)
NRBC BLD-RTO: 0 PER 100 WBC
PLATELET # BLD AUTO: 374 K/UL (ref 150–400)
PMV BLD AUTO: 9.6 FL (ref 8.9–12.9)
RBC # BLD AUTO: 4.75 M/UL (ref 3.8–5.2)
SPECIMEN EXP DATE BLD: NORMAL
WBC # BLD AUTO: 9.5 K/UL (ref 3.6–11)

## 2022-10-31 PROCEDURE — 36415 COLL VENOUS BLD VENIPUNCTURE: CPT

## 2022-10-31 PROCEDURE — 81025 URINE PREGNANCY TEST: CPT

## 2022-10-31 PROCEDURE — 85025 COMPLETE CBC W/AUTO DIFF WBC: CPT

## 2022-10-31 PROCEDURE — 86900 BLOOD TYPING SEROLOGIC ABO: CPT

## 2022-10-31 RX ORDER — CHOLECALCIFEROL (VITAMIN D3) 125 MCG
10 CAPSULE ORAL
COMMUNITY

## 2022-10-31 RX ORDER — ACETAMINOPHEN 500 MG
1000 TABLET ORAL
COMMUNITY

## 2022-10-31 RX ORDER — FERROUS SULFATE 324(65)MG
1 TABLET, DELAYED RELEASE (ENTERIC COATED) ORAL
COMMUNITY

## 2022-10-31 RX ORDER — ZINC GLUCONATE 10 MG
1 LOZENGE ORAL DAILY
COMMUNITY

## 2022-10-31 RX ORDER — LANOLIN ALCOHOL/MO/W.PET/CERES
1 CREAM (GRAM) TOPICAL DAILY
COMMUNITY

## 2022-10-31 NOTE — PERIOP NOTES
[address] [facility] [address], [address] [facility]   MAIN OR                                  [phone]   MAIN PRE OP                          [phone]                                                                                AMBULATORY PRE OP          [phone]  PRE-ADMISSION TESTING    [phone]   Surgery Date:  Tuesday, 11/8/2022       Is surgery arrival time given by surgeon? NO  If NO, [facility] staff will call you between 3 and 7pm the day before your surgery with your arrival time. (If your surgery is on a Monday, we will call you the Friday before.)    Call [phone] after 7pm Monday-Friday if you did not receive this call. INSTRUCTIONS BEFORE YOUR SURGERY   When You  Arrive Arrive at the [address] [facility] on the day of your surgery  Have your insurance card, photo ID, and any copayment (if needed)   Food   and   Drink NO food or drink after midnight the night before surgery    This means NO water, gum, mints, coffee, juice, etc.  No alcohol (beer, wine, liquor) 24 hours before and after surgery   Medications to   TAKE   Morning of Surgery MEDICATIONS TO TAKE THE MORNING OF SURGERY WITH A SIP OF WATER:   None   Medications  To  STOP      7 days before surgery Non-Steroidal anti-inflammatory Drugs (NSAID's): for example, Ibuprofen (Advil, Motrin), Naproxen (Aleve)  Aspirin, if taking for pain   Herbal supplements, vitamins, and fish oil  OK to continue your iron supplement and your Metamucil. Other:  (Pain medications not listed above, including Tylenol may be taken)   Blood  Thinners If you take  Aspirin, Plavix, Coumadin, or any blood-thinning or anti-blood clot medicine, talk to the doctor who prescribed the medications for pre-operative instructions.    Bathing Clothing  Jewelry  Valuables     If you shower the morning of surgery, please do not apply anything to your skin (lotions, powders, deodorant, or makeup, especially rhonda)  Follow [employer] body wash instructions provided to you during PAT appointment. Begin 3 days prior to surgery. Do not shave or trim anywhere 24 hours before surgery  Wear your hair loose or down; no pony-tails, buns, or metal hair clips  Wear loose, comfortable, clean clothes  Wear glasses instead of contacts  Leave money, valuables, and jewelry, including body piercings, at home  If you were given an [employer], bring it on day of surgery. Going Home - or Spending the Night SAME-DAY SURGERY: You must have a responsible adult drive you home and stay with you 24 hours after surgery  ADMITS: If your doctor is keeping you in the hospital after surgery, leave personal belongings/luggage in your car until you have a hospital room number. Hospital discharge time is 12 noon  Drivers must be here before 12 noon unless you are told differently   Special Instructions Free  parking from 7am-5pm.       Follow all instructions so your surgery wont be cancelled. Please, be on time. If a situation occurs and you are delayed the day of surgery, call [phone]. If your physical condition changes (like a fever, cold, flu, etc.) call your surgeon. Home medication(s) reviewed and verified via  LIST   VERBAL   during PAT appointment. The patient was contacted by  IN-PERSON  The patient verbalizes understanding of all instructions and DOES NOT   need reinforcement.

## 2022-10-31 NOTE — PERIOP NOTES
The surgical posting stated that Dr. Moody Mead will be assisting Dr. Bashir Officer, but the surgical orders state Dr. Francisca Michelle will Minus Slay Dr. Sandy Lilly office to clarify. Per the office, Dr. Moody Mead will now be assisting. They will fax over updated orders reflecting this.   DOS: 11/8/2022

## 2022-11-07 ENCOUNTER — ANESTHESIA EVENT (OUTPATIENT)
Dept: SURGERY | Age: 42
End: 2022-11-07
Payer: COMMERCIAL

## 2022-11-08 ENCOUNTER — ANESTHESIA (OUTPATIENT)
Dept: SURGERY | Age: 42
End: 2022-11-08
Payer: COMMERCIAL

## 2022-11-08 ENCOUNTER — HOSPITAL ENCOUNTER (OUTPATIENT)
Age: 42
Setting detail: OUTPATIENT SURGERY
Discharge: HOME | End: 2022-11-08
Attending: OBSTETRICS & GYNECOLOGY | Admitting: OBSTETRICS & GYNECOLOGY
Payer: COMMERCIAL

## 2022-11-08 VITALS
BODY MASS INDEX: 26.33 KG/M2 | HEART RATE: 91 BPM | HEIGHT: 62 IN | OXYGEN SATURATION: 97 % | WEIGHT: 143.08 LBS | RESPIRATION RATE: 22 BRPM | SYSTOLIC BLOOD PRESSURE: 123 MMHG | DIASTOLIC BLOOD PRESSURE: 82 MMHG | TEMPERATURE: 98.7 F

## 2022-11-08 DIAGNOSIS — D21.9 FIBROID: Primary | ICD-10-CM

## 2022-11-08 PROCEDURE — 77030018778 HC MANIP UTER VCAR CNMD -B: Performed by: OBSTETRICS & GYNECOLOGY

## 2022-11-08 PROCEDURE — 77030025625 HC DEV TISS SEAL J&J -F: Performed by: OBSTETRICS & GYNECOLOGY

## 2022-11-08 PROCEDURE — 74011000250 HC RX REV CODE- 250: Performed by: OBSTETRICS & GYNECOLOGY

## 2022-11-08 PROCEDURE — 77030037032 HC INSRT SCIS CLICKLLINE DISP STOR -B: Performed by: OBSTETRICS & GYNECOLOGY

## 2022-11-08 PROCEDURE — 77030016151 HC PROTCTR LNS DFOG COVD -B: Performed by: OBSTETRICS & GYNECOLOGY

## 2022-11-08 PROCEDURE — 77030002933 HC SUT MCRYL J&J -A: Performed by: OBSTETRICS & GYNECOLOGY

## 2022-11-08 PROCEDURE — 76060000038 HC ANESTHESIA 3.5 TO 4 HR: Performed by: OBSTETRICS & GYNECOLOGY

## 2022-11-08 PROCEDURE — 74011250636 HC RX REV CODE- 250/636: Performed by: ANESTHESIOLOGY

## 2022-11-08 PROCEDURE — 77030040361 HC SLV COMPR DVT MDII -B

## 2022-11-08 PROCEDURE — 77030040922 HC BLNKT HYPOTHRM STRY -A

## 2022-11-08 PROCEDURE — 77030027743 HC APPL F/HEMSTAT BARD -B: Performed by: OBSTETRICS & GYNECOLOGY

## 2022-11-08 PROCEDURE — 77030014090 HC TRCR OPT AMR -B: Performed by: OBSTETRICS & GYNECOLOGY

## 2022-11-08 PROCEDURE — 77030008606 HC TRCR ENDOSC KII AMR -B: Performed by: OBSTETRICS & GYNECOLOGY

## 2022-11-08 PROCEDURE — C1765 ADHESION BARRIER: HCPCS | Performed by: OBSTETRICS & GYNECOLOGY

## 2022-11-08 PROCEDURE — 77030032060 HC PWDR HEMSTAT ARISTA ASRB 3GM BARD -C: Performed by: OBSTETRICS & GYNECOLOGY

## 2022-11-08 PROCEDURE — 76010000134 HC OR TIME 3.5 TO 4 HR: Performed by: OBSTETRICS & GYNECOLOGY

## 2022-11-08 PROCEDURE — 77030010507 HC ADH SKN DERMBND J&J -B: Performed by: OBSTETRICS & GYNECOLOGY

## 2022-11-08 PROCEDURE — 77030018684: Performed by: OBSTETRICS & GYNECOLOGY

## 2022-11-08 PROCEDURE — 74011250636 HC RX REV CODE- 250/636: Performed by: OBSTETRICS & GYNECOLOGY

## 2022-11-08 PROCEDURE — 76210000001 HC OR PH I REC 2.5 TO 3 HR: Performed by: OBSTETRICS & GYNECOLOGY

## 2022-11-08 PROCEDURE — 77030038613 HC SUT PDS STRATA SPIRL J&J -B: Performed by: OBSTETRICS & GYNECOLOGY

## 2022-11-08 PROCEDURE — 2709999900 HC NON-CHARGEABLE SUPPLY: Performed by: OBSTETRICS & GYNECOLOGY

## 2022-11-08 PROCEDURE — 88307 TISSUE EXAM BY PATHOLOGIST: CPT

## 2022-11-08 PROCEDURE — 77030031139 HC SUT VCRL2 J&J -A: Performed by: OBSTETRICS & GYNECOLOGY

## 2022-11-08 RX ORDER — HYDROMORPHONE HYDROCHLORIDE 2 MG/1
2 TABLET ORAL
Qty: 18 TABLET | Refills: 0 | Status: SHIPPED | OUTPATIENT
Start: 2022-11-08 | End: 2022-11-11

## 2022-11-08 RX ORDER — IBUPROFEN 800 MG/1
800 TABLET ORAL
Qty: 30 TABLET | Refills: 1 | Status: SHIPPED | OUTPATIENT
Start: 2022-11-08

## 2022-11-08 RX ORDER — ALBUTEROL SULFATE 0.83 MG/ML
2.5 SOLUTION RESPIRATORY (INHALATION) AS NEEDED
Status: DISCONTINUED | OUTPATIENT
Start: 2022-11-08 | End: 2022-11-09 | Stop reason: HOSPADM

## 2022-11-08 RX ORDER — BUPIVACAINE HYDROCHLORIDE AND EPINEPHRINE 2.5; 5 MG/ML; UG/ML
INJECTION, SOLUTION EPIDURAL; INFILTRATION; INTRACAUDAL; PERINEURAL AS NEEDED
Status: DISCONTINUED | OUTPATIENT
Start: 2022-11-08 | End: 2022-11-08 | Stop reason: HOSPADM

## 2022-11-08 RX ORDER — SODIUM CHLORIDE, SODIUM LACTATE, POTASSIUM CHLORIDE, CALCIUM CHLORIDE 600; 310; 30; 20 MG/100ML; MG/100ML; MG/100ML; MG/100ML
150 INJECTION, SOLUTION INTRAVENOUS CONTINUOUS
Status: DISCONTINUED | OUTPATIENT
Start: 2022-11-08 | End: 2022-11-08 | Stop reason: HOSPADM

## 2022-11-08 RX ORDER — HYDROMORPHONE HYDROCHLORIDE 2 MG/ML
INJECTION, SOLUTION INTRAMUSCULAR; INTRAVENOUS; SUBCUTANEOUS AS NEEDED
Status: DISCONTINUED | OUTPATIENT
Start: 2022-11-08 | End: 2022-11-08 | Stop reason: HOSPADM

## 2022-11-08 RX ORDER — ONDANSETRON 2 MG/ML
4 INJECTION INTRAMUSCULAR; INTRAVENOUS AS NEEDED
Status: DISCONTINUED | OUTPATIENT
Start: 2022-11-08 | End: 2022-11-09 | Stop reason: HOSPADM

## 2022-11-08 RX ORDER — DEXAMETHASONE SODIUM PHOSPHATE 4 MG/ML
INJECTION, SOLUTION INTRA-ARTICULAR; INTRALESIONAL; INTRAMUSCULAR; INTRAVENOUS; SOFT TISSUE AS NEEDED
Status: DISCONTINUED | OUTPATIENT
Start: 2022-11-08 | End: 2022-11-08 | Stop reason: HOSPADM

## 2022-11-08 RX ORDER — METRONIDAZOLE 500 MG/100ML
500 INJECTION, SOLUTION INTRAVENOUS ONCE
Status: COMPLETED | OUTPATIENT
Start: 2022-11-08 | End: 2022-11-08

## 2022-11-08 RX ORDER — ONDANSETRON 4 MG/1
4 TABLET, ORALLY DISINTEGRATING ORAL
Qty: 20 TABLET | Refills: 1 | Status: SHIPPED | OUTPATIENT
Start: 2022-11-08

## 2022-11-08 RX ORDER — ONDANSETRON 2 MG/ML
INJECTION INTRAMUSCULAR; INTRAVENOUS AS NEEDED
Status: DISCONTINUED | OUTPATIENT
Start: 2022-11-08 | End: 2022-11-08 | Stop reason: HOSPADM

## 2022-11-08 RX ORDER — PHENYLEPHRINE HCL IN 0.9% NACL 0.4MG/10ML
SYRINGE (ML) INTRAVENOUS AS NEEDED
Status: DISCONTINUED | OUTPATIENT
Start: 2022-11-08 | End: 2022-11-08 | Stop reason: HOSPADM

## 2022-11-08 RX ORDER — PROPOFOL 10 MG/ML
INJECTION, EMULSION INTRAVENOUS AS NEEDED
Status: DISCONTINUED | OUTPATIENT
Start: 2022-11-08 | End: 2022-11-08 | Stop reason: HOSPADM

## 2022-11-08 RX ORDER — DEXMEDETOMIDINE HYDROCHLORIDE 100 UG/ML
INJECTION, SOLUTION INTRAVENOUS AS NEEDED
Status: DISCONTINUED | OUTPATIENT
Start: 2022-11-08 | End: 2022-11-08 | Stop reason: HOSPADM

## 2022-11-08 RX ORDER — DIPHENHYDRAMINE HYDROCHLORIDE 50 MG/ML
12.5 INJECTION, SOLUTION INTRAMUSCULAR; INTRAVENOUS AS NEEDED
Status: DISCONTINUED | OUTPATIENT
Start: 2022-11-08 | End: 2022-11-08 | Stop reason: HOSPADM

## 2022-11-08 RX ORDER — ROCURONIUM BROMIDE 10 MG/ML
INJECTION, SOLUTION INTRAVENOUS AS NEEDED
Status: DISCONTINUED | OUTPATIENT
Start: 2022-11-08 | End: 2022-11-08 | Stop reason: HOSPADM

## 2022-11-08 RX ORDER — SODIUM CHLORIDE, SODIUM LACTATE, POTASSIUM CHLORIDE, CALCIUM CHLORIDE 600; 310; 30; 20 MG/100ML; MG/100ML; MG/100ML; MG/100ML
INJECTION, SOLUTION INTRAVENOUS
Status: DISCONTINUED | OUTPATIENT
Start: 2022-11-08 | End: 2022-11-08 | Stop reason: HOSPADM

## 2022-11-08 RX ORDER — FENTANYL CITRATE 50 UG/ML
INJECTION, SOLUTION INTRAMUSCULAR; INTRAVENOUS AS NEEDED
Status: DISCONTINUED | OUTPATIENT
Start: 2022-11-08 | End: 2022-11-08 | Stop reason: HOSPADM

## 2022-11-08 RX ORDER — LIDOCAINE HYDROCHLORIDE 20 MG/ML
INJECTION, SOLUTION EPIDURAL; INFILTRATION; INTRACAUDAL; PERINEURAL AS NEEDED
Status: DISCONTINUED | OUTPATIENT
Start: 2022-11-08 | End: 2022-11-08 | Stop reason: HOSPADM

## 2022-11-08 RX ORDER — SODIUM CHLORIDE, SODIUM LACTATE, POTASSIUM CHLORIDE, CALCIUM CHLORIDE 600; 310; 30; 20 MG/100ML; MG/100ML; MG/100ML; MG/100ML
125 INJECTION, SOLUTION INTRAVENOUS CONTINUOUS
Status: DISCONTINUED | OUTPATIENT
Start: 2022-11-08 | End: 2022-11-09 | Stop reason: HOSPADM

## 2022-11-08 RX ORDER — LIDOCAINE HYDROCHLORIDE 10 MG/ML
0.1 INJECTION, SOLUTION EPIDURAL; INFILTRATION; INTRACAUDAL; PERINEURAL AS NEEDED
Status: DISCONTINUED | OUTPATIENT
Start: 2022-11-08 | End: 2022-11-08 | Stop reason: HOSPADM

## 2022-11-08 RX ORDER — MIDAZOLAM HYDROCHLORIDE 1 MG/ML
INJECTION, SOLUTION INTRAMUSCULAR; INTRAVENOUS AS NEEDED
Status: DISCONTINUED | OUTPATIENT
Start: 2022-11-08 | End: 2022-11-08 | Stop reason: HOSPADM

## 2022-11-08 RX ORDER — HYDROMORPHONE HYDROCHLORIDE 1 MG/ML
0.5 INJECTION, SOLUTION INTRAMUSCULAR; INTRAVENOUS; SUBCUTANEOUS
Status: DISCONTINUED | OUTPATIENT
Start: 2022-11-08 | End: 2022-11-08 | Stop reason: HOSPADM

## 2022-11-08 RX ADMIN — DEXMEDETOMIDINE HYDROCHLORIDE 4 MCG: 100 INJECTION, SOLUTION INTRAVENOUS at 16:08

## 2022-11-08 RX ADMIN — ROCURONIUM BROMIDE 10 MG: 10 INJECTION, SOLUTION INTRAVENOUS at 15:16

## 2022-11-08 RX ADMIN — SODIUM CHLORIDE, SODIUM LACTATE, POTASSIUM CHLORIDE, CALCIUM CHLORIDE: 600; 310; 30; 20 INJECTION, SOLUTION INTRAVENOUS at 13:40

## 2022-11-08 RX ADMIN — LIDOCAINE HYDROCHLORIDE 60 MG: 20 INJECTION, SOLUTION EPIDURAL; INFILTRATION; INTRACAUDAL; PERINEURAL at 13:34

## 2022-11-08 RX ADMIN — MIDAZOLAM HYDROCHLORIDE 2 MG: 1 INJECTION, SOLUTION INTRAMUSCULAR; INTRAVENOUS at 13:25

## 2022-11-08 RX ADMIN — Medication 80 MCG: at 16:22

## 2022-11-08 RX ADMIN — ONDANSETRON 4 MG: 2 INJECTION INTRAMUSCULAR; INTRAVENOUS at 16:22

## 2022-11-08 RX ADMIN — HYDROMORPHONE HYDROCHLORIDE 0.5 MG: 2 INJECTION, SOLUTION INTRAMUSCULAR; INTRAVENOUS; SUBCUTANEOUS at 15:28

## 2022-11-08 RX ADMIN — DIPHENHYDRAMINE HYDROCHLORIDE 12.5 MG: 50 INJECTION, SOLUTION INTRAMUSCULAR; INTRAVENOUS at 17:41

## 2022-11-08 RX ADMIN — ROCURONIUM BROMIDE 20 MG: 10 INJECTION, SOLUTION INTRAVENOUS at 14:18

## 2022-11-08 RX ADMIN — Medication 80 MCG: at 15:17

## 2022-11-08 RX ADMIN — HYDROMORPHONE HYDROCHLORIDE 0.5 MG: 2 INJECTION, SOLUTION INTRAMUSCULAR; INTRAVENOUS; SUBCUTANEOUS at 16:38

## 2022-11-08 RX ADMIN — ROCURONIUM BROMIDE 40 MG: 10 INJECTION, SOLUTION INTRAVENOUS at 13:34

## 2022-11-08 RX ADMIN — HYDROMORPHONE HYDROCHLORIDE 0.5 MG: 2 INJECTION, SOLUTION INTRAMUSCULAR; INTRAVENOUS; SUBCUTANEOUS at 14:46

## 2022-11-08 RX ADMIN — METRONIDAZOLE 500 MG: 500 INJECTION, SOLUTION INTRAVENOUS at 13:43

## 2022-11-08 RX ADMIN — FENTANYL CITRATE 50 MCG: 50 INJECTION, SOLUTION INTRAMUSCULAR; INTRAVENOUS at 13:36

## 2022-11-08 RX ADMIN — FENTANYL CITRATE 50 MCG: 50 INJECTION, SOLUTION INTRAMUSCULAR; INTRAVENOUS at 13:32

## 2022-11-08 RX ADMIN — PROPOFOL 50 MG: 10 INJECTION, EMULSION INTRAVENOUS at 15:58

## 2022-11-08 RX ADMIN — PROPOFOL 150 MG: 10 INJECTION, EMULSION INTRAVENOUS at 13:34

## 2022-11-08 RX ADMIN — SODIUM CHLORIDE, POTASSIUM CHLORIDE, SODIUM LACTATE AND CALCIUM CHLORIDE: 600; 310; 30; 20 INJECTION, SOLUTION INTRAVENOUS at 13:15

## 2022-11-08 RX ADMIN — HYDROMORPHONE HYDROCHLORIDE 0.5 MG: 1 INJECTION, SOLUTION INTRAMUSCULAR; INTRAVENOUS; SUBCUTANEOUS at 17:24

## 2022-11-08 RX ADMIN — DEXMEDETOMIDINE HYDROCHLORIDE 4 MCG: 100 INJECTION, SOLUTION INTRAVENOUS at 15:59

## 2022-11-08 RX ADMIN — CEFAZOLIN SODIUM 2 G: 1 POWDER, FOR SOLUTION INTRAMUSCULAR; INTRAVENOUS at 13:42

## 2022-11-08 RX ADMIN — SODIUM CHLORIDE, SODIUM LACTATE, POTASSIUM CHLORIDE, CALCIUM CHLORIDE: 600; 310; 30; 20 INJECTION, SOLUTION INTRAVENOUS at 15:31

## 2022-11-08 RX ADMIN — DEXAMETHASONE SODIUM PHOSPHATE 8 MG: 4 INJECTION, SOLUTION INTRA-ARTICULAR; INTRALESIONAL; INTRAMUSCULAR; INTRAVENOUS; SOFT TISSUE at 13:36

## 2022-11-08 RX ADMIN — Medication 80 MCG: at 14:33

## 2022-11-08 RX ADMIN — ROCURONIUM BROMIDE 20 MG: 10 INJECTION, SOLUTION INTRAVENOUS at 14:47

## 2022-11-08 RX ADMIN — PROPOFOL 50 MG: 10 INJECTION, EMULSION INTRAVENOUS at 16:06

## 2022-11-08 NOTE — DISCHARGE INSTRUCTIONS
After Care Instructions for Your Laparoscopic Hysterectomy      1. When you are discharged from the hospital, you should plan to eat a bland, light diet for the first 1-2 days. Avoid spicy or greasy foods and high roughage foods such as salads and raw vegetables (these can cause gas and bloating). Drink plenty of non-caffeinated fluids (water is best). You may resume your usual diet gradually. 2. Avoid heavy lifting or straining. Gradually increase your activity. First try walking and doing light activity around the house. Most women can return to work 2-3 weeks after the procedure. You should speak with your doctor about your individual return to work plan and any other restrictions. 3. You may take showers. Please do not take baths until you are seen for your post-operative visit. 4. It is not unusual to experience some discomfort under your ribs and/or soreness of your neck and shoulders over the first 1-2 days. This is due to the gas used in your abdomen during the surgery to help your doctor see your pelvic organs. This gas gets naturally reabsorbed. The right shoulder is often more sore than the left. 5. It is not unusual to have vaginal spotting lasting up to 2 weeks off and on. Some women do not experience any bleeding at all. You should call your doctor immediately if you ever experience heavy vaginal bleeding or gushes of any liquid coming from your vagina that does not seem like the amount you would expect for your normal vaginal discharge. 6. Bruises may appear around the incisions and will gradually resolve as they heal.    7. There are several ways that your incisions may be closed. Most of these do not require the removal of any sutures. Some patients may have skin glue, Dermabond, placed over the incisions. Do not try to peel this off, it will gradually wear off on its own. Other patients will have small paper strips placed over the incisions.   Allow these to fall off on their own or your doctor will remove them. No other special dressing is required. 8. Call the office at [phone] to report any of the following problems: abdominal pain that is increasing in severity despite using the prescribed pain medication, heavy vaginal bleeding, drainage or separation of your incision(s), temperature greater than 100.4 or persistent nausea and vomiting. 9. You should be seen in the office following your surgery. Call for an appointment if this has not already been arranged. At this appointment, the findings noted at the time of your procedure and /or pathology reports will be reviewed. {Medication reconciliation information is now added to the patient's AVS automatically when it is printed. There is no need to use this SmartLink in discharge instructions.   Highlight this text and delete it to clear this message}

## 2022-11-08 NOTE — ANESTHESIA PREPROCEDURE EVALUATION
Relevant Problems   No relevant active problems       Anesthetic History   No history of anesthetic complications            Review of Systems / Medical History  Patient summary reviewed and pertinent labs reviewed    Pulmonary  Within defined limits                 Neuro/Psych   Within defined limits        Pertinent negatives: No seizures, TIA and CVA   Cardiovascular              Hyperlipidemia  Pertinent negatives: No past MI, angina and CHF  Exercise tolerance: >4 METS     GI/Hepatic/Renal  Within defined limits           Pertinent negatives: No renal disease   Endo/Other          Pertinent negatives: No diabetes   Other Findings   Comments: Uterine fibroids           Physical Exam    Airway  Mallampati: II  TM Distance: 4 - 6 cm  Neck ROM: normal range of motion   Mouth opening: Normal     Cardiovascular      Rate: normal         Dental    Dentition: Upper dentition intact and Lower dentition intact     Pulmonary  Breath sounds clear to auscultation               Abdominal  GI exam deferred       Other Findings            Anesthetic Plan    ASA: 2  Anesthesia type: general          Induction: Intravenous  Anesthetic plan and risks discussed with: Patient

## 2022-11-08 NOTE — OP NOTES
Operative Note    Patient ID:   Name: Trent Hare    Medical Record Number: [id number]    YOB: 1980    Preoperative Diagnosis: MENORRHAGIA/FIBROIDS    Postoperative Diagnosis: MENORRHAGIA/FIBROIDS    Surgeon:  Magda Padilla MD     Assistant:  OR assistant    Anesthesia: General    Procedure: Total Laparoscopic Hysterectomy, bilateral salpingectomy    Findings:  Normal appearing ovaries. Massively enlarged fibroid uterus. Estimated Blood Loss: 100    Drains: none    Pathology /Specimens:     ID Type Source Tests Collected by Time Destination   1 : Uterus, cervix, bilateral fallopian tubes Preservative Uterus with Bilateral Fallopian Tubes  Sanna Paredes MD 11/8/2022 1441 Pathology       DVT Prophylaxis: DARNELL Hose    Antibiotic Prophylaxis: Ancef, flagyl    After understanding the risks, benefits, and alternatives to the proposed procedure, the patient was taken to the operating room, where she was administered general anesthesia in the supine position. She was then placed in the dorsal lithotomy position and prepped and draped in usual sterile fashion. A rodney catheter is placed. A sidearm speculum is introduced into the vagina. There cervix is grasped with a single tooth tenaculum. A [employer] uterine manipulator is placed and the balloon inflated with air. Gloves are changed and attention is turned to the abdomen. A Archuleta's point incision is made, and access is gained using the optiview technique with a 5 mm trocar. Pneumoperitoneum is obtained and intraabdominal placement is verified. There was no bleeding and no evidence of injury to the bowel. 5 mm incisions were then made in the left and right lower and right upper quadrants and 5 mm ports placed in each of those under direct visualization. Findings were noted as above. The articulating EnSeal was used. The ureters were identified on either side.  On the left hand side, the tube and uteroovarian ligament was clamped and divided using the EnSeal. The dissection was then taken down through the round ligament. The anterior peritoneum was incised at the midline. A large anterior cervical fibroid is navigated and the cup identified underneath it's leading edge. The bladder was dissected off the lower uterine segment and cervix. Posterior peritoneum was taken down, skeletonizing the uterine arteries. The uterine arteries were clamped, coagulated and cut across the ascending branches using EnSeal. Cardinal ligament was developed. The same steps were followed on the right side. The fornices of the vagina are identified via the VCare cup. Colpotomy was made with the Harmonic and carried around the VCare cup. The bilateral mesosalpinges are divided with the EnSeal to remove both fallopian tubes which are then withdrawn through the trocar. The LLQ incision is extended slightly and an Ga bag placed through that incision. The uterus is placed inside it and the  ring passed through the vagina. The Excite technique is used to laboriously remove the uterus, with great care taken to avoid injury to the vagina. Once it is completely excised, the bag is removed and the specimen passed off. Gloves were changed and attention turned back to the abdomen. The vaginal cuff was then closed with a running suture of 2-0 Stratafix with care taken to incorporate the angles of the cuff on each side. This stitch is doubled back across the length of the closure. Hemostasis was achieved. The pelvis was irrigated with copious amounts of saline and suctioned away. Hemostasis was assured. The left and right lower trocars were removed under direct visualization. Once the pneumoperitoneum was completely reduced and hemostasis is still effective, the final trocar was removed. The fascia at the LLQ incision is closed with 0 vicryl. Skin of all incisions was closed with 4-0 Monocryl in a subcuticular closure.   0.5% Marcaine with epinephrine is injected at each incision site. Dermabond was applied to the skin. All sponge, lap, needle, and instrument counts were correct times two. Subsequently, the patient was cleaned up, the rodney was removed, and she was returned to the supine position, awakened, and taken to the recovery room in stable condition.      Signed By: Ayla Parker MD

## 2022-11-08 NOTE — H&P
Gynecology History and Physical    Name: Norberto Langley MRN: [id number] SSN: [id number]    YOB: 1980  Age: 42 y.o. Sex: female       Subjective:      Chief complaint:  Araceli Hernandez is a 42 y.o.  female with a history of abnormal uterine bleeding. Ultrasound include fibroids. Previous treatment measures include oral contraceptives. She is admitted for Procedure(s) (LRB):  TOTAL LAPAROSCOPIC HYSTERECTOMY/ BILATERAL SALPINGECTOMY (N/A). The current method of family planning is none. Past Medical History:   Diagnosis Date    Anemia     Pap smear for cervical cancer screening 5/8/19 neg HPV NEG     Past Surgical History:   Procedure Laterality Date    HX CARPAL TUNNEL RELEASE Right 2018    HX TUBAL LIGATION  01/01/2005    HX WISDOM TEETH EXTRACTION  2020     Allergies   Allergen Reactions    Hydrocodone Itching     Prior to Admission medications    Medication Sig Start Date End Date Taking? Authorizing Provider   melatonin 5 mg tablet Take 10 mg by mouth nightly. Yes Provider, Historical   psyllium husk/aspartame (METAMUCIL FIBER SINGLES PO) Take 1 Capsule by mouth daily. Provider, Historical   magnesium 250 mg tab Take 1 Tablet by mouth daily. Provider, Historical   acetaminophen (TYLENOL) 500 mg tablet Take 1,000 mg by mouth every six (6) hours as needed for Pain. Provider, Historical   glucosamine-chondroitin (ARTHX) 500-400 mg cap Take 1 Capsule by mouth daily. Provider, Historical   ferrous sulfate 324 mg (65 mg iron) tablet Take 1 mg by mouth Daily (before breakfast). Provider, Historical   multivit,calc,mins/iron/folic (ONE-A-DAY WOMENS FORMULA PO) Take 1 Tablet by mouth daily. Provider, Historical      Family History   Problem Relation Age of Onset    Hypertension Mother     OSTEOARTHRITIS Mother      OB History    No obstetric history on file.        Social History     Socioeconomic History    Marital status:      Spouse name: Not on file    Number of children: Not on file    Years of education: Not on file    Highest education level: Not on file   Occupational History    Not on file   Tobacco Use    Smoking status: Never    Smokeless tobacco: Never   Vaping Use    Vaping Use: Never used   Substance and Sexual Activity    Alcohol use: Yes     Alcohol/week: 2.0 standard drinks     Types: 2 Glasses of wine per week    Drug use: No    Sexual activity: Not on file   Other Topics Concern    Not on file   Social History Narrative    Not on file     Social Determinants of Health     Financial Resource Strain: Not on file   Food Insecurity: Not on file   Transportation Needs: Not on file   Physical Activity: Not on file   Stress: Not on file   Social Connections: Not on file   Intimate Partner Violence: Not on file   Housing Stability: Not on file       Review of Systems:  A comprehensive review of systems was negative except for that written in the History of Present Illness. Objective:     Vitals:    11/08/22 1033   BP: (!) 105/53   Pulse: 72   Resp: 17   Temp: 98.4 °F (36.9 °C)   SpO2: 97%   Weight: 64.9 kg (143 lb 1.3 oz)   Height: 5' 2\" (1.575 m)       General:  alert, cooperative, no distress, appears stated age   Skin:  Normal.   Eyes: conjunctivae/corneas clear. PERRL, EOM's intact. Fundi benign   Mouth: MMM no lesions   Lymph Nodes:  Cervical, supraclavicular, and axillary nodes normal.   Lungs:  clear to auscultation bilaterally   Heart:  regular rate and rhythm, S1, S2 normal, no murmur, click, rub or gallop   Abdomen: soft, non-tender.  Bowel sounds normal. No masses,  no organomegaly   CVA:  absent   Genitourinary: exam deferred   Extremities:  extremities normal, atraumatic, no cyanosis or edema   Neurologic:  negative   Psychiatric:  non focal         Assessment:     Fibroids with abnormal uterine bleeding    Plan:     Procedure(s) (LRB):  TOTAL LAPAROSCOPIC HYSTERECTOMY/ BILATERAL SALPINGECTOMY (N/A)  Discussed the risks of surgery including the risks of bleeding, infection, deep vein thrombosis, and surgical injuries to internal organs including but not limited to the bowels, bladder, rectum, and female reproductive organs. The patient understands the risks; any and all questions were answered to the patient's satisfaction.     Signed By:  Johnny Marx MD     November 8, 2022

## 2024-02-27 ENCOUNTER — OFFICE VISIT (OUTPATIENT)
Age: 44
End: 2024-02-27
Payer: COMMERCIAL

## 2024-02-27 VITALS
WEIGHT: 152 LBS | SYSTOLIC BLOOD PRESSURE: 118 MMHG | TEMPERATURE: 97.9 F | DIASTOLIC BLOOD PRESSURE: 70 MMHG | OXYGEN SATURATION: 99 % | RESPIRATION RATE: 16 BRPM | HEIGHT: 62 IN | HEART RATE: 75 BPM | BODY MASS INDEX: 27.97 KG/M2

## 2024-02-27 DIAGNOSIS — Z12.31 ENCOUNTER FOR SCREENING MAMMOGRAM FOR MALIGNANT NEOPLASM OF BREAST: ICD-10-CM

## 2024-02-27 DIAGNOSIS — L85.3 DRY SKIN DERMATITIS: Primary | ICD-10-CM

## 2024-02-27 DIAGNOSIS — L03.012 PARONYCHIA OF LEFT MIDDLE FINGER: ICD-10-CM

## 2024-02-27 DIAGNOSIS — E66.3 OVERWEIGHT: ICD-10-CM

## 2024-02-27 DIAGNOSIS — Z11.4 SCREENING FOR HIV (HUMAN IMMUNODEFICIENCY VIRUS): ICD-10-CM

## 2024-02-27 PROCEDURE — 99203 OFFICE O/P NEW LOW 30 MIN: CPT | Performed by: FAMILY MEDICINE

## 2024-02-27 SDOH — ECONOMIC STABILITY: FOOD INSECURITY: WITHIN THE PAST 12 MONTHS, THE FOOD YOU BOUGHT JUST DIDN'T LAST AND YOU DIDN'T HAVE MONEY TO GET MORE.: NEVER TRUE

## 2024-02-27 SDOH — ECONOMIC STABILITY: HOUSING INSECURITY
IN THE LAST 12 MONTHS, WAS THERE A TIME WHEN YOU DID NOT HAVE A STEADY PLACE TO SLEEP OR SLEPT IN A SHELTER (INCLUDING NOW)?: NO

## 2024-02-27 SDOH — ECONOMIC STABILITY: FOOD INSECURITY: WITHIN THE PAST 12 MONTHS, YOU WORRIED THAT YOUR FOOD WOULD RUN OUT BEFORE YOU GOT MONEY TO BUY MORE.: NEVER TRUE

## 2024-02-27 SDOH — ECONOMIC STABILITY: INCOME INSECURITY: HOW HARD IS IT FOR YOU TO PAY FOR THE VERY BASICS LIKE FOOD, HOUSING, MEDICAL CARE, AND HEATING?: NOT HARD AT ALL

## 2024-02-27 ASSESSMENT — PATIENT HEALTH QUESTIONNAIRE - PHQ9
SUM OF ALL RESPONSES TO PHQ QUESTIONS 1-9: 0
1. LITTLE INTEREST OR PLEASURE IN DOING THINGS: 0
SUM OF ALL RESPONSES TO PHQ QUESTIONS 1-9: 0
SUM OF ALL RESPONSES TO PHQ9 QUESTIONS 1 & 2: 0
2. FEELING DOWN, DEPRESSED OR HOPELESS: 0
SUM OF ALL RESPONSES TO PHQ QUESTIONS 1-9: 0
SUM OF ALL RESPONSES TO PHQ QUESTIONS 1-9: 0

## 2024-02-27 NOTE — PROGRESS NOTES
[facility]  [address] [place] [address]  Phone: [phone]  Fax: [phone]        Chief Complaint   Patient presents with    New Patient    Establish Care    Annual Exam    Orders     Referral for dermatology for skin changes in her chin area.      She is a 43 y.o. female who presents for establish care.    She reports that there is increased itching and tightness of the skin of her face.  Has been present over the past 2 weeks or so.  She uses a moisturizer (Cerave).  Cleans her face every night as well.  No changes and no new products.    Has swelling and pain around her left middle fingernail for the past few months.  No injury. Worse when she gets it wet.  Very painful if she brushes her hand up against something.    She is s/p hysterectomy in 11/2022.  She has not been regularly followed with Gyn.     Prior to Visit Medications    Medication Sig Taking? Authorizing Provider   MAGNESIUM PO Take 1 tablet by mouth daily Yes Provider, MD Yaya   melatonin 5 MG TABS tablet Take 2 tablets by mouth nightly Yes Automatic Reconciliation, Ar       Allergies   Allergen Reactions    Hydrocodone Itching         Reviewed PmHx, RxHx, FmHx, SocHx, AllgHx and updated and dated in the chart.      Objective:     Vitals:    02/27/24 1555   BP: 118/70   Site: Left Upper Arm   Position: Sitting   Cuff Size: Large Adult   Pulse: 75   Resp: 16   Temp: 97.9 °F (36.6 °C)   TempSrc: Temporal   SpO2: 99%   Weight: 68.9 kg (152 lb)   Height: 1.575 m (5' 2\")     Physical Examination:    Physical Exam  Vitals and nursing note reviewed.   Constitutional:       General: She is not in acute distress.     Appearance: Normal appearance.   HENT:      Head: Normocephalic and atraumatic.   Cardiovascular:      Rate and Rhythm: Normal rate and regular rhythm.      Heart sounds: Normal heart sounds.   Pulmonary:      Effort: Pulmonary effort is normal.      Breath sounds: Normal breath sounds.

## 2024-02-27 NOTE — PROGRESS NOTES
Identified pt with two pt identifiers(name and ).    Chief Complaint   Patient presents with    New Patient    Establish Care    Annual Exam    Orders     Referral for dermatology for skin changes in her chin area.         Health Maintenance Due   Topic    Hepatitis B vaccine (1 of 3 - 3-dose series)    HIV screen     Depression Screen     Flu vaccine (1)    COVID-19 Vaccine ( season)       Wt Readings from Last 3 Encounters:   24 68.9 kg (152 lb)   10/31/22 65.4 kg (144 lb 1.6 oz)   22 63.3 kg (139 lb 9.6 oz)     Temp Readings from Last 3 Encounters:   24 97.9 °F (36.6 °C) (Temporal)     BP Readings from Last 3 Encounters:   24 118/70   10/31/22 131/86   22 120/74     Pulse Readings from Last 3 Encounters:   24 75   10/31/22 81   22 70           Depression Screening:  :         2024     3:54 PM   PHQ-9 Questionaire   Little interest or pleasure in doing things 0   Feeling down, depressed, or hopeless 0   PHQ-9 Total Score 0        Fall Risk Assessment:  :          No data to display                 Abuse Screening:  :          No data to display                 Coordination of Care Questionnaire:  :     \"Have you been to the ER, urgent care clinic since your last visit?  Hospitalized since your last visit?\"    NO    “Have you seen or consulted any other health care providers outside of [facility] since your last visit?”    NO

## 2024-03-01 ENCOUNTER — HOSPITAL ENCOUNTER (OUTPATIENT)
Facility: HOSPITAL | Age: 44
End: 2024-03-01
Attending: FAMILY MEDICINE
Payer: COMMERCIAL

## 2024-03-01 DIAGNOSIS — Z12.31 ENCOUNTER FOR SCREENING MAMMOGRAM FOR MALIGNANT NEOPLASM OF BREAST: ICD-10-CM

## 2024-03-01 DIAGNOSIS — E66.3 OVERWEIGHT: ICD-10-CM

## 2024-03-01 DIAGNOSIS — Z11.4 SCREENING FOR HIV (HUMAN IMMUNODEFICIENCY VIRUS): ICD-10-CM

## 2024-03-01 LAB
ALBUMIN SERPL-MCNC: 4.1 G/DL (ref 3.5–5)
ALBUMIN/GLOB SERPL: 1.3 (ref 1.1–2.2)
ALP SERPL-CCNC: 80 U/L (ref 45–117)
ALT SERPL-CCNC: 37 U/L (ref 12–78)
ANION GAP SERPL CALC-SCNC: 4 MMOL/L (ref 5–15)
AST SERPL-CCNC: 20 U/L (ref 15–37)
BASOPHILS # BLD: 0.1 K/UL (ref 0–0.1)
BASOPHILS NFR BLD: 1 % (ref 0–1)
BILIRUB SERPL-MCNC: 0.5 MG/DL (ref 0.2–1)
BUN SERPL-MCNC: 10 MG/DL (ref 6–20)
BUN/CREAT SERPL: 16 (ref 12–20)
CALCIUM SERPL-MCNC: 9.4 MG/DL (ref 8.5–10.1)
CHLORIDE SERPL-SCNC: 105 MMOL/L (ref 97–108)
CHOLEST SERPL-MCNC: 234 MG/DL
CO2 SERPL-SCNC: 27 MMOL/L (ref 21–32)
CREAT SERPL-MCNC: 0.61 MG/DL (ref 0.55–1.02)
DIFFERENTIAL METHOD BLD: ABNORMAL
EOSINOPHIL # BLD: 0.8 K/UL (ref 0–0.4)
EOSINOPHIL NFR BLD: 8 % (ref 0–7)
ERYTHROCYTE [DISTWIDTH] IN BLOOD BY AUTOMATED COUNT: 14.1 % (ref 11.5–14.5)
GLOBULIN SER CALC-MCNC: 3.2 G/DL (ref 2–4)
GLUCOSE SERPL-MCNC: 102 MG/DL (ref 65–100)
HCT VFR BLD AUTO: 43 % (ref 35–47)
HDLC SERPL-MCNC: 79 MG/DL
HDLC SERPL: 3 (ref 0–5)
HGB BLD-MCNC: 14.5 G/DL (ref 11.5–16)
HIV 1+2 AB+HIV1 P24 AG SERPL QL IA: NONREACTIVE
HIV 1/2 RESULT COMMENT: NORMAL
IMM GRANULOCYTES # BLD AUTO: 0 K/UL (ref 0–0.04)
IMM GRANULOCYTES NFR BLD AUTO: 0 % (ref 0–0.5)
LDLC SERPL CALC-MCNC: 131.6 MG/DL (ref 0–100)
LYMPHOCYTES # BLD: 2.4 K/UL (ref 0.8–3.5)
LYMPHOCYTES NFR BLD: 24 % (ref 12–49)
MCH RBC QN AUTO: 30.1 PG (ref 26–34)
MCHC RBC AUTO-ENTMCNC: 33.7 G/DL (ref 30–36.5)
MCV RBC AUTO: 89.4 FL (ref 80–99)
MONOCYTES # BLD: 0.6 K/UL (ref 0–1)
MONOCYTES NFR BLD: 6 % (ref 5–13)
NEUTS SEG # BLD: 6.2 K/UL (ref 1.8–8)
NEUTS SEG NFR BLD: 61 % (ref 32–75)
NRBC # BLD: 0 K/UL (ref 0–0.01)
NRBC BLD-RTO: 0 PER 100 WBC
PLATELET # BLD AUTO: 330 K/UL (ref 150–400)
PMV BLD AUTO: 9.8 FL (ref 8.9–12.9)
POTASSIUM SERPL-SCNC: 4.5 MMOL/L (ref 3.5–5.1)
PROT SERPL-MCNC: 7.3 G/DL (ref 6.4–8.2)
RBC # BLD AUTO: 4.81 M/UL (ref 3.8–5.2)
SODIUM SERPL-SCNC: 136 MMOL/L (ref 136–145)
TRIGL SERPL-MCNC: 117 MG/DL
VLDLC SERPL CALC-MCNC: 23.4 MG/DL
WBC # BLD AUTO: 10.2 K/UL (ref 3.6–11)

## 2024-03-01 PROCEDURE — 77063 BREAST TOMOSYNTHESIS BI: CPT

## 2024-11-18 ENCOUNTER — HOSPITAL ENCOUNTER (EMERGENCY)
Facility: HOSPITAL | Age: 44
Discharge: HOME | End: 2024-11-18
Attending: STUDENT IN AN ORGANIZED HEALTH CARE EDUCATION/TRAINING PROGRAM

## 2024-11-18 VITALS
SYSTOLIC BLOOD PRESSURE: 121 MMHG | RESPIRATION RATE: 20 BRPM | DIASTOLIC BLOOD PRESSURE: 66 MMHG | OXYGEN SATURATION: 97 % | HEART RATE: 88 BPM | BODY MASS INDEX: 28.02 KG/M2 | WEIGHT: 153.22 LBS | TEMPERATURE: 98.1 F

## 2024-11-18 DIAGNOSIS — J02.9 VIRAL PHARYNGITIS: Primary | ICD-10-CM

## 2024-11-18 LAB
FLUAV RNA SPEC QL NAA+PROBE: NOT DETECTED
FLUBV RNA SPEC QL NAA+PROBE: NOT DETECTED
S PYO DNA THROAT QL NAA+PROBE: NOT DETECTED
SARS-COV-2 RNA RESP QL NAA+PROBE: NOT DETECTED
SOURCE: NORMAL

## 2024-11-18 PROCEDURE — 6370000000 HC RX 637 (ALT 250 FOR IP): Performed by: STUDENT IN AN ORGANIZED HEALTH CARE EDUCATION/TRAINING PROGRAM

## 2024-11-18 PROCEDURE — 87651 STREP A DNA AMP PROBE: CPT

## 2024-11-18 PROCEDURE — 99283 EMERGENCY DEPT VISIT LOW MDM: CPT

## 2024-11-18 PROCEDURE — 87636 SARSCOV2 & INF A&B AMP PRB: CPT

## 2024-11-18 RX ORDER — ECHINACEA PURPUREA EXTRACT 125 MG
1 TABLET ORAL PRN
Qty: 1 EACH | Refills: 3 | Status: SHIPPED | OUTPATIENT
Start: 2024-11-18 | End: 2024-11-18

## 2024-11-18 RX ORDER — BENZOCAINE AND MENTHOL, UNSPECIFIED FORM 15; 2.3 MG/1; MG/1
1 LOZENGE ORAL
Qty: 16 LOZENGE | Refills: 0 | Status: SHIPPED | OUTPATIENT
Start: 2024-11-18

## 2024-11-18 RX ORDER — IBUPROFEN 600 MG/1
600 TABLET, FILM COATED ORAL EVERY 6 HOURS PRN
Qty: 28 TABLET | Refills: 0 | Status: SHIPPED | OUTPATIENT
Start: 2024-11-18 | End: 2024-11-25

## 2024-11-18 RX ORDER — ACETAMINOPHEN 500 MG
1000 TABLET ORAL EVERY 6 HOURS PRN
Qty: 56 TABLET | Refills: 0 | Status: SHIPPED | OUTPATIENT
Start: 2024-11-18 | End: 2024-11-25

## 2024-11-18 RX ORDER — LIDOCAINE HYDROCHLORIDE 20 MG/ML
15 SOLUTION OROPHARYNGEAL ONCE
Status: COMPLETED | OUTPATIENT
Start: 2024-11-18 | End: 2024-11-18

## 2024-11-18 RX ORDER — ECHINACEA PURPUREA EXTRACT 125 MG
1 TABLET ORAL
Qty: 1 EACH | Refills: 0 | Status: SHIPPED | OUTPATIENT
Start: 2024-11-18

## 2024-11-18 RX ADMIN — LIDOCAINE HYDROCHLORIDE 15 ML: 20 SOLUTION ORAL at 20:24

## 2024-11-18 ASSESSMENT — LIFESTYLE VARIABLES
HOW OFTEN DO YOU HAVE A DRINK CONTAINING ALCOHOL: NEVER
HOW MANY STANDARD DRINKS CONTAINING ALCOHOL DO YOU HAVE ON A TYPICAL DAY: PATIENT DOES NOT DRINK

## 2024-11-18 ASSESSMENT — PAIN SCALES - GENERAL: PAINLEVEL_OUTOF10: 9

## 2024-11-18 ASSESSMENT — PAIN DESCRIPTION - DESCRIPTORS: DESCRIPTORS: SHARP

## 2024-11-18 ASSESSMENT — PAIN DESCRIPTION - LOCATION: LOCATION: THROAT

## 2024-11-18 ASSESSMENT — PAIN - FUNCTIONAL ASSESSMENT: PAIN_FUNCTIONAL_ASSESSMENT: ACTIVITIES ARE NOT PREVENTED

## 2024-11-18 ASSESSMENT — PAIN DESCRIPTION - ONSET: ONSET: SUDDEN

## 2024-11-18 ASSESSMENT — PAIN DESCRIPTION - ORIENTATION: ORIENTATION: INNER

## 2024-11-18 ASSESSMENT — PAIN DESCRIPTION - PAIN TYPE: TYPE: ACUTE PAIN

## 2024-11-18 ASSESSMENT — PAIN DESCRIPTION - FREQUENCY: FREQUENCY: CONTINUOUS

## 2024-11-19 NOTE — ED TRIAGE NOTES
Pt ambulated to the treatment area with a steady gait. Pt states \"I want to [place] on November 2nd I started having dry throat and in my nose when I would blow it there was blood I thought it was allergies but then I traveled to [place] on November 6th and it became a cough still very dry I have a sore throat and nasal congestion and today my eyes became swollen no fever no headache no body aches. This morning my eyes were crusted closed.\"

## 2024-11-19 NOTE — ED NOTES
Condition Stable  Patient discharged to home  Patient education was completed  Education taught to patient  Teaching method used was handout and verbal  Understanding of teaching was good  Patient was discharged ambulatory  Discharged with self  Valuables were given to patient remained in possession of belongings during stay

## 2024-11-19 NOTE — ED PROVIDER NOTES
patient agreeable and requesting discharge.    Diagnosis is viral pharyngitis. Disposition is Discharge with PCP follow-up. Workup and plan discussed with patient , return precautions given for worsening or concerning symptoms, all questions answered, and they are in agreement with the  plan .                      Total critical care time (not including time spent performing separately reportable procedures):         Review of external notes and Independent historians utilized in decision making: External notes reviewed includeOutside medication list     Diagnostics independently interpreted by me: None    Discussions with other clinicians and healthcare agents:  None    Risks considered in patient's treatment plan: Prescription drug management - Rx nasal saline, Cepacol, Tylenol, ibuprofen        HISTORY OF PRESENT ILLNESS   (Location/Symptom, Timing/Onset, Context/Setting, Quality, Duration, Modifying Factors, Severity)  Note limiting factors.   See MDM    Nursing Notes were reviewed.    REVIEW OF SYSTEMS    (2-9 systems for level 4, 10 or more for level 5)   See MDM    PAST MEDICAL HISTORY     Past Medical History:   Diagnosis Date    Anemia     Pap smear for cervical cancer screening 5/8/19 neg HPV NEG       SURGICAL HISTORY       Past Surgical History:   Procedure Laterality Date    CARPAL TUNNEL RELEASE Right 2018    TUBAL LIGATION  01/01/2005    WISDOM TOOTH EXTRACTION  2020       CURRENT MEDICATIONS       Discharge Medication List as of 11/18/2024  8:24 PM        CONTINUE these medications which have NOT CHANGED    Details   MAGNESIUM PO Take 1 tablet by mouth dailyHistorical Med      melatonin 5 MG TABS tablet Take 2 tablets by mouth nightlyHistorical Med             ALLERGIES     Hydrocodone    FAMILY HISTORY       Family History   Problem Relation Age of Onset    Osteoarthritis Mother     Hypertension Mother           SOCIAL HISTORY       Social History     Socioeconomic History    Marital status:

## (undated) DEVICE — COVER LT HNDL PLAS RIG 1 PER PK

## (undated) DEVICE — SOLUTION IRRIG 1000ML 0.9% SOD CHL USP POUR PLAS BTL

## (undated) DEVICE — SEALER ENSEAL SHFTCUR 3MMX35CM --

## (undated) DEVICE — Device

## (undated) DEVICE — VISUALIZATION SYSTEM: Brand: CLEARIFY

## (undated) DEVICE — APPLICATOR SURG XL L38CM FOR ARISTA ABSRB HEMSTAT FLEXITIP

## (undated) DEVICE — HYPODERMIC SAFETY NEEDLE: Brand: MAGELLAN

## (undated) DEVICE — TROCAR: Brand: KII SLEEVE

## (undated) DEVICE — VCARE MEDIUM, UTERINE MANIPULATOR, VAGINAL-CERVICAL-AHLUWALIA'S-RETRACTOR-ELEVATOR: Brand: VCARE

## (undated) DEVICE — TOWEL SURG W17XL27IN STD BLU COT NONFENESTRATED PREWASHED

## (undated) DEVICE — SUTURE SZ 0 27IN 5/8 CIR UR-6  TAPER PT VIOLET ABSRB VICRYL J603H

## (undated) DEVICE — BARRIER TISS ADH ABSRB 3X4IN -- GYNECARE INTERCEED

## (undated) DEVICE — EVAC SMOKE SEECLEAR XCL -- SEE CLEAR

## (undated) DEVICE — SHEARS ENDOSCP HARM 36CM ULTRASONIC CRV TIP UPGRD

## (undated) DEVICE — AGENT HEMSTAT 3GM PURIFIED PLNT STARCH PWD ABSRB ARISTA AH

## (undated) DEVICE — CANISTER, RIGID, 3000CC: Brand: MEDLINE INDUSTRIES, INC.

## (undated) DEVICE — GOWN,SIRUS,NONRNF,SETINSLV,2XL,18/CS: Brand: MEDLINE

## (undated) DEVICE — ELECTRO LUBE IS A SINGLE PATIENT USE DEVICE THAT IS INTENDED TO BE USED ON ELECTROSURGICAL ELECTRODES TO REDUCE STICKING.: Brand: KEY SURGICAL ELECTRO LUBE

## (undated) DEVICE — DERMABOND SKIN ADH 0.7ML -- DERMABOND ADVANCED 12/BX

## (undated) DEVICE — TROCAR: Brand: KII OPTICAL ACCESS SYSTEM

## (undated) DEVICE — GYN LAPAROSCOPY-SFMC: Brand: MEDLINE INDUSTRIES, INC.

## (undated) DEVICE — PAD POSITIONING WNG STD KIT W/BODY STRP LF DISP

## (undated) DEVICE — TROCAR: Brand: KII FIOS FIRST ENTRY

## (undated) DEVICE — CLICKLINE SCISSORS INSERT: Brand: CLICKLINE

## (undated) DEVICE — SUT MCRYL 4-0 27IN PS2 UD --

## (undated) DEVICE — SUTURE STRATAFIX SPRL PDS + SZ 2-0 L9IN ABSRB VLT CT-1 SXPP1B456

## (undated) DEVICE — GLOVE ORANGE PI 7 1/2   MSG9075